# Patient Record
Sex: MALE | Race: OTHER | HISPANIC OR LATINO | URBAN - METROPOLITAN AREA
[De-identification: names, ages, dates, MRNs, and addresses within clinical notes are randomized per-mention and may not be internally consistent; named-entity substitution may affect disease eponyms.]

---

## 2017-09-06 ENCOUNTER — EMERGENCY (EMERGENCY)
Facility: HOSPITAL | Age: 37
LOS: 1 days | Discharge: HOME | End: 2017-09-06
Attending: STUDENT IN AN ORGANIZED HEALTH CARE EDUCATION/TRAINING PROGRAM
Payer: SELF-PAY

## 2017-09-06 VITALS
WEIGHT: 175.05 LBS | HEIGHT: 65 IN | SYSTOLIC BLOOD PRESSURE: 127 MMHG | TEMPERATURE: 98 F | DIASTOLIC BLOOD PRESSURE: 81 MMHG | OXYGEN SATURATION: 99 % | RESPIRATION RATE: 18 BRPM | HEART RATE: 70 BPM

## 2017-09-06 PROCEDURE — 99284 EMERGENCY DEPT VISIT MOD MDM: CPT | Mod: 25

## 2017-09-06 PROCEDURE — 99053 MED SERV 10PM-8AM 24 HR FAC: CPT

## 2017-09-06 NOTE — ED ADULT TRIAGE NOTE - CHIEF COMPLAINT QUOTE
pt c/o head and neck pain for 3 days. denies injury, states he took some medication from another hospital 1 week ago but still there.

## 2017-09-07 PROCEDURE — 72040 X-RAY EXAM NECK SPINE 2-3 VW: CPT | Mod: 26

## 2017-09-07 PROCEDURE — 99283 EMERGENCY DEPT VISIT LOW MDM: CPT | Mod: 25

## 2017-09-07 PROCEDURE — 96372 THER/PROPH/DIAG INJ SC/IM: CPT

## 2017-09-07 PROCEDURE — 72040 X-RAY EXAM NECK SPINE 2-3 VW: CPT

## 2017-09-07 RX ORDER — TIZANIDINE 4 MG/1
2 TABLET ORAL
Qty: 0 | Refills: 0 | COMMUNITY

## 2017-09-07 RX ORDER — KETOROLAC TROMETHAMINE 30 MG/ML
60 SYRINGE (ML) INJECTION ONCE
Qty: 0 | Refills: 0 | Status: DISCONTINUED | OUTPATIENT
Start: 2017-09-07 | End: 2017-09-07

## 2017-09-07 RX ADMIN — Medication 60 MILLIGRAM(S): at 02:35

## 2017-09-07 NOTE — ED PROVIDER NOTE - PROGRESS NOTE DETAILS
Pt treated with pain medication and x-ray negative for an acute fracture . Pt will F/U with Medical clinic as discussed ,

## 2017-09-07 NOTE — ED PROVIDER NOTE - OBJECTIVE STATEMENT
36 yr old M presented to ED with neck pain x 1 week. Pt explained that he got up with neck pain x 1 week ago and was sen at [facility] for his symptoms. Pt says that he was treated with pain medication and a muscle relaxant but it not relieve his symptoms. Pt denies fever, chills body aches or weakness. Pt says that his pain from the back of his neck radiated to his right arm. Pt denies any other complaints this  time.

## 2017-09-07 NOTE — ED ADULT NURSE NOTE - OBJECTIVE STATEMENT
Pt. complaining of neck pain 10/10.  Pt. states pain has been going on for 3 days without relief; pt. states he went to [facility] and was prescribed buspirone 10mg and tizanidine 4mg without relief.  Pt. states when he sleeps he is relaxed and his neck does not bother him; pt. states the pain begins once he wakes up.  No edema or erythema noted to area.  Full ROM noted.  Pt. denies trauma/injury/heavy lifting.

## 2017-09-07 NOTE — ED PROVIDER NOTE - CARE PLAN
Principal Discharge DX:	Musculoskeletal neck pain  Instructions for follow-up, activity and diet:	Continue with pain medication as discussed

## 2017-09-07 NOTE — ED PROVIDER NOTE - ATTENDING CONTRIBUTION TO CARE
37 yo male presents with musculoskeletal neck pain. I personally saw the patient with the PA, and completed the key components of the history and physical exam. I then discussed the management plan with the PA.

## 2017-09-07 NOTE — ED PROVIDER NOTE - MEDICAL DECISION MAKING DETAILS
Pt with neck pain that got worst x 2-3 day ago. Pt was seen at [facility] and he says that his symptom improved . F/U with PCP

## 2018-08-14 ENCOUNTER — EMERGENCY (EMERGENCY)
Facility: HOSPITAL | Age: 38
LOS: 1 days | Discharge: HOME | End: 2018-08-14
Attending: EMERGENCY MEDICINE
Payer: SELF-PAY

## 2018-08-14 VITALS — WEIGHT: 175.05 LBS | HEIGHT: 65 IN

## 2018-08-14 VITALS
RESPIRATION RATE: 20 BRPM | SYSTOLIC BLOOD PRESSURE: 108 MMHG | TEMPERATURE: 98 F | OXYGEN SATURATION: 99 % | HEART RATE: 59 BPM | DIASTOLIC BLOOD PRESSURE: 71 MMHG

## 2018-08-14 DIAGNOSIS — Z90.49 ACQUIRED ABSENCE OF OTHER SPECIFIED PARTS OF DIGESTIVE TRACT: Chronic | ICD-10-CM

## 2018-08-14 LAB
ALBUMIN SERPL ELPH-MCNC: 4.1 G/DL — SIGNIFICANT CHANGE UP (ref 3.3–5.2)
ALP SERPL-CCNC: 73 U/L — SIGNIFICANT CHANGE UP (ref 40–120)
ALT FLD-CCNC: 21 U/L — SIGNIFICANT CHANGE UP
ANION GAP SERPL CALC-SCNC: 12 MMOL/L — SIGNIFICANT CHANGE UP (ref 5–17)
APTT BLD: 31.7 SEC — SIGNIFICANT CHANGE UP (ref 27.5–37.4)
AST SERPL-CCNC: 18 U/L — SIGNIFICANT CHANGE UP
BASOPHILS # BLD AUTO: 0 K/UL — SIGNIFICANT CHANGE UP (ref 0–0.2)
BASOPHILS NFR BLD AUTO: 0.4 % — SIGNIFICANT CHANGE UP (ref 0–2)
BILIRUB SERPL-MCNC: 0.4 MG/DL — SIGNIFICANT CHANGE UP (ref 0.4–2)
BLD GP AB SCN SERPL QL: SIGNIFICANT CHANGE UP
BUN SERPL-MCNC: 16 MG/DL — SIGNIFICANT CHANGE UP (ref 8–20)
CALCIUM SERPL-MCNC: 9.1 MG/DL — SIGNIFICANT CHANGE UP (ref 8.6–10.2)
CHLORIDE SERPL-SCNC: 104 MMOL/L — SIGNIFICANT CHANGE UP (ref 98–107)
CO2 SERPL-SCNC: 25 MMOL/L — SIGNIFICANT CHANGE UP (ref 22–29)
CREAT SERPL-MCNC: 0.61 MG/DL — SIGNIFICANT CHANGE UP (ref 0.5–1.3)
EOSINOPHIL # BLD AUTO: 0.3 K/UL — SIGNIFICANT CHANGE UP (ref 0–0.5)
EOSINOPHIL NFR BLD AUTO: 4.6 % — SIGNIFICANT CHANGE UP (ref 0–5)
GLUCOSE SERPL-MCNC: 100 MG/DL — SIGNIFICANT CHANGE UP (ref 70–115)
HCT VFR BLD CALC: 43.9 % — SIGNIFICANT CHANGE UP (ref 42–52)
HGB BLD-MCNC: 14.8 G/DL — SIGNIFICANT CHANGE UP (ref 14–18)
INR BLD: 1.11 RATIO — SIGNIFICANT CHANGE UP (ref 0.88–1.16)
LIDOCAIN IGE QN: 35 U/L — SIGNIFICANT CHANGE UP (ref 22–51)
LYMPHOCYTES # BLD AUTO: 3 K/UL — SIGNIFICANT CHANGE UP (ref 1–4.8)
LYMPHOCYTES # BLD AUTO: 41.8 % — SIGNIFICANT CHANGE UP (ref 20–55)
MCHC RBC-ENTMCNC: 29.3 PG — SIGNIFICANT CHANGE UP (ref 27–31)
MCHC RBC-ENTMCNC: 33.7 G/DL — SIGNIFICANT CHANGE UP (ref 32–36)
MCV RBC AUTO: 86.9 FL — SIGNIFICANT CHANGE UP (ref 80–94)
MONOCYTES # BLD AUTO: 0.5 K/UL — SIGNIFICANT CHANGE UP (ref 0–0.8)
MONOCYTES NFR BLD AUTO: 7.2 % — SIGNIFICANT CHANGE UP (ref 3–10)
NEUTROPHILS # BLD AUTO: 3.2 K/UL — SIGNIFICANT CHANGE UP (ref 1.8–8)
NEUTROPHILS NFR BLD AUTO: 45.3 % — SIGNIFICANT CHANGE UP (ref 37–73)
PLATELET # BLD AUTO: 236 K/UL — SIGNIFICANT CHANGE UP (ref 150–400)
POTASSIUM SERPL-MCNC: 4.2 MMOL/L — SIGNIFICANT CHANGE UP (ref 3.5–5.3)
POTASSIUM SERPL-SCNC: 4.2 MMOL/L — SIGNIFICANT CHANGE UP (ref 3.5–5.3)
PROT SERPL-MCNC: 7.2 G/DL — SIGNIFICANT CHANGE UP (ref 6.6–8.7)
PROTHROM AB SERPL-ACNC: 12.2 SEC — SIGNIFICANT CHANGE UP (ref 9.8–12.7)
RBC # BLD: 5.05 M/UL — SIGNIFICANT CHANGE UP (ref 4.6–6.2)
RBC # FLD: 13 % — SIGNIFICANT CHANGE UP (ref 11–15.6)
SODIUM SERPL-SCNC: 141 MMOL/L — SIGNIFICANT CHANGE UP (ref 135–145)
TYPE + AB SCN PNL BLD: SIGNIFICANT CHANGE UP
WBC # BLD: 7.1 K/UL — SIGNIFICANT CHANGE UP (ref 4.8–10.8)
WBC # FLD AUTO: 7.1 K/UL — SIGNIFICANT CHANGE UP (ref 4.8–10.8)

## 2018-08-14 PROCEDURE — 99284 EMERGENCY DEPT VISIT MOD MDM: CPT | Mod: 25

## 2018-08-14 PROCEDURE — 86900 BLOOD TYPING SEROLOGIC ABO: CPT

## 2018-08-14 PROCEDURE — 99053 MED SERV 10PM-8AM 24 HR FAC: CPT

## 2018-08-14 PROCEDURE — 36415 COLL VENOUS BLD VENIPUNCTURE: CPT

## 2018-08-14 PROCEDURE — 85610 PROTHROMBIN TIME: CPT

## 2018-08-14 PROCEDURE — 83690 ASSAY OF LIPASE: CPT

## 2018-08-14 PROCEDURE — 76705 ECHO EXAM OF ABDOMEN: CPT

## 2018-08-14 PROCEDURE — 76705 ECHO EXAM OF ABDOMEN: CPT | Mod: 26

## 2018-08-14 PROCEDURE — 85027 COMPLETE CBC AUTOMATED: CPT

## 2018-08-14 PROCEDURE — 96374 THER/PROPH/DIAG INJ IV PUSH: CPT

## 2018-08-14 PROCEDURE — T1013: CPT

## 2018-08-14 PROCEDURE — 86901 BLOOD TYPING SEROLOGIC RH(D): CPT

## 2018-08-14 PROCEDURE — 80053 COMPREHEN METABOLIC PANEL: CPT

## 2018-08-14 PROCEDURE — 85730 THROMBOPLASTIN TIME PARTIAL: CPT

## 2018-08-14 PROCEDURE — 96375 TX/PRO/DX INJ NEW DRUG ADDON: CPT

## 2018-08-14 PROCEDURE — 86850 RBC ANTIBODY SCREEN: CPT

## 2018-08-14 RX ORDER — KETOROLAC TROMETHAMINE 30 MG/ML
15 SYRINGE (ML) INJECTION ONCE
Qty: 0 | Refills: 0 | Status: DISCONTINUED | OUTPATIENT
Start: 2018-08-14 | End: 2018-08-14

## 2018-08-14 RX ORDER — FAMOTIDINE 10 MG/ML
1 INJECTION INTRAVENOUS
Qty: 14 | Refills: 0 | OUTPATIENT
Start: 2018-08-14 | End: 2018-08-27

## 2018-08-14 RX ORDER — ONDANSETRON 8 MG/1
4 TABLET, FILM COATED ORAL ONCE
Qty: 0 | Refills: 0 | Status: COMPLETED | OUTPATIENT
Start: 2018-08-14 | End: 2018-08-14

## 2018-08-14 RX ORDER — ONDANSETRON 8 MG/1
1 TABLET, FILM COATED ORAL
Qty: 6 | Refills: 0 | OUTPATIENT
Start: 2018-08-14 | End: 2018-08-15

## 2018-08-14 RX ORDER — SODIUM CHLORIDE 9 MG/ML
1000 INJECTION INTRAMUSCULAR; INTRAVENOUS; SUBCUTANEOUS ONCE
Qty: 0 | Refills: 0 | Status: COMPLETED | OUTPATIENT
Start: 2018-08-14 | End: 2018-08-14

## 2018-08-14 RX ADMIN — ONDANSETRON 4 MILLIGRAM(S): 8 TABLET, FILM COATED ORAL at 06:17

## 2018-08-14 RX ADMIN — SODIUM CHLORIDE 1000 MILLILITER(S): 9 INJECTION INTRAMUSCULAR; INTRAVENOUS; SUBCUTANEOUS at 06:17

## 2018-08-14 RX ADMIN — Medication 15 MILLIGRAM(S): at 06:16

## 2018-08-14 NOTE — ED PROVIDER NOTE - PROGRESS NOTE DETAILS
Ultrasound reviewed, lab work reviewed, pt reports improvement in symptoms, surgery reviewed, pt explained results and d/c instructions, copy of results printed Upon patient being discharge, patient stating he feels dizzy, will PO challenge observe see if patient improves ev pt says he feels much better no abd pain and not dizzy will d/c and rec close f/u pmd

## 2018-08-14 NOTE — ED ADULT NURSE NOTE - NSIMPLEMENTINTERV_GEN_ALL_ED
Implemented All Universal Safety Interventions:  [place] to call system. Call bell, personal items and telephone within reach. Instruct patient to call for assistance. Room bathroom lighting operational. Non-slip footwear when patient is off stretcher. Physically safe environment: no spills, clutter or unnecessary equipment. Stretcher in lowest position, wheels locked, appropriate side rails in place.

## 2018-08-14 NOTE — ED PROVIDER NOTE - ATTENDING CONTRIBUTION TO CARE
36yo male with acute onset of ruq pain. I personally saw the patient with the PA, and completed the key components of the history and physical exam. I then discussed the management plan with the PA.

## 2018-08-14 NOTE — ED ADULT NURSE NOTE - OBJECTIVE STATEMENT
Pt c/o RLQ abdominal pain starting a few hours prior to coming to ED, denies n/v/d to RN, denies PMH, denies sick contacts/fevers at home, pt AOx4

## 2018-08-14 NOTE — ED PROVIDER NOTE - OBJECTIVE STATEMENT
Patient is a 36 y/o male c/o of abdominal pain that started three hours ago. Patient reporting abdominal pain is RUQ. Patient admitting to nausea and multiple episodes of vomiting (non-bloody, non-bilious). Patient denies experiencing pain before in the past. Patient admits to appendectomy in the past. Patient denies cp, SOB, fever, diarrhea, back pain, dysuria.

## 2018-10-11 ENCOUNTER — EMERGENCY (EMERGENCY)
Facility: HOSPITAL | Age: 38
LOS: 1 days | Discharge: LEFT BEFORE DISCHARGE | End: 2018-10-11
Attending: EMERGENCY MEDICINE
Payer: SELF-PAY

## 2018-10-11 VITALS
OXYGEN SATURATION: 96 % | HEART RATE: 65 BPM | DIASTOLIC BLOOD PRESSURE: 67 MMHG | SYSTOLIC BLOOD PRESSURE: 112 MMHG | RESPIRATION RATE: 16 BRPM | TEMPERATURE: 98 F

## 2018-10-11 VITALS — WEIGHT: 169.09 LBS

## 2018-10-11 DIAGNOSIS — Z90.49 ACQUIRED ABSENCE OF OTHER SPECIFIED PARTS OF DIGESTIVE TRACT: Chronic | ICD-10-CM

## 2018-10-11 PROCEDURE — T1013: CPT

## 2020-06-04 ENCOUNTER — EMERGENCY (EMERGENCY)
Facility: HOSPITAL | Age: 40
LOS: 1 days | Discharge: HOME | End: 2020-06-04
Attending: EMERGENCY MEDICINE
Payer: MEDICAID

## 2020-06-04 VITALS
WEIGHT: 175.93 LBS | HEIGHT: 66 IN | DIASTOLIC BLOOD PRESSURE: 72 MMHG | HEART RATE: 105 BPM | OXYGEN SATURATION: 98 % | TEMPERATURE: 98 F | SYSTOLIC BLOOD PRESSURE: 111 MMHG | RESPIRATION RATE: 20 BRPM

## 2020-06-04 DIAGNOSIS — Z90.49 ACQUIRED ABSENCE OF OTHER SPECIFIED PARTS OF DIGESTIVE TRACT: Chronic | ICD-10-CM

## 2020-06-04 PROCEDURE — 99283 EMERGENCY DEPT VISIT LOW MDM: CPT | Mod: 25

## 2020-06-04 PROCEDURE — 96372 THER/PROPH/DIAG INJ SC/IM: CPT

## 2020-06-04 PROCEDURE — T1013: CPT

## 2020-06-04 PROCEDURE — 99284 EMERGENCY DEPT VISIT MOD MDM: CPT

## 2020-06-04 RX ORDER — LIDOCAINE 4 G/100G
1 CREAM TOPICAL ONCE
Refills: 0 | Status: COMPLETED | OUTPATIENT
Start: 2020-06-04 | End: 2020-06-04

## 2020-06-04 RX ORDER — ACETAMINOPHEN 500 MG
650 TABLET ORAL ONCE
Refills: 0 | Status: COMPLETED | OUTPATIENT
Start: 2020-06-04 | End: 2020-06-04

## 2020-06-04 RX ORDER — CYCLOBENZAPRINE HYDROCHLORIDE 10 MG/1
10 TABLET, FILM COATED ORAL ONCE
Refills: 0 | Status: COMPLETED | OUTPATIENT
Start: 2020-06-04 | End: 2020-06-04

## 2020-06-04 RX ORDER — CYCLOBENZAPRINE HYDROCHLORIDE 10 MG/1
1 TABLET, FILM COATED ORAL
Qty: 9 | Refills: 0
Start: 2020-06-04 | End: 2020-06-06

## 2020-06-04 RX ORDER — KETOROLAC TROMETHAMINE 30 MG/ML
30 SYRINGE (ML) INJECTION ONCE
Refills: 0 | Status: DISCONTINUED | OUTPATIENT
Start: 2020-06-04 | End: 2020-06-04

## 2020-06-04 RX ADMIN — Medication 650 MILLIGRAM(S): at 16:41

## 2020-06-04 RX ADMIN — CYCLOBENZAPRINE HYDROCHLORIDE 10 MILLIGRAM(S): 10 TABLET, FILM COATED ORAL at 16:41

## 2020-06-04 RX ADMIN — Medication 30 MILLIGRAM(S): at 16:42

## 2020-06-04 RX ADMIN — LIDOCAINE 1 PATCH: 4 CREAM TOPICAL at 16:42

## 2020-06-04 NOTE — ED STATDOCS - CLINICAL SUMMARY MEDICAL DECISION MAKING FREE TEXT BOX
40yo male with low back pain radiating to L leg in setting of lifting daughter- suspect radicular pain, tx with meds, dc home with spine f/u. Lianet Collado DO

## 2020-06-04 NOTE — ED STATDOCS - OBJECTIVE STATEMENT
40 y/o M pt with significant PMHx of appendicitis presents to the ED c/o lower back pain that began yesterday. He describes the pain as burning. It started after he was walking with his daughter for over an hour and picking her up. He has taken 2 Tylenols yesterday which helped a litle bit. No f, n, chills, dysuria, hematuria. He reports that the pain radiates to his left leg and left foot. He reports numbness on the left leg and foot as well. He reports that the heaviest thing he lifted was his daughter, who is roughly 50 pounds. Pt denies trauma. He tested positive for COVID-19 two months ago but those sx have resolved. Pt denies hx of IV drug use. No further complaints at this time. 40 y/o M pt with significant PMHx of appendicitis presents to the ED c/o lower back pain that began yesterday. He describes the pain as burning. It started after he was walking with his daughter for over an hour and picking her up. He has taken 2 Tylenols yesterday which helped a litle bit. No f, n, chills, dysuria, hematuria. He reports that the pain radiates to his left leg and left foot. He reports numbness on the left leg and foot as well. He reports that the heaviest thing he lifted was his daughter, who is roughly 50 pounds. Pt denies trauma. He tested positive for COVID-19 two months ago but those sx have resolved. Pt denies hx of IV drug use. No further complaints at this time. Denies urinary retention, bowel/bladder incontinence, back injections, IVDA, saddle anesthesia.

## 2020-06-04 NOTE — ED STATDOCS - PHYSICAL EXAMINATION
Gen: NAD, AOx3  Head: NCAT  Lung: CTAB, no respiratory distress, no wheezing, rales, rhonchi  CV: normal s1/s2, rrr, no murmurs, Normal perfusion  Abd: soft, NTND  MSK: No edema, no visible deformities, full range of motion in all 4 extremities, +TTP paraspinal muscles thoracolumbar junction  Neuro: No midline T/L spine tenderness, strength 5/5 b/ LE, sensation intact, No focal neurologic deficits  Skin: No rash   Psych: normal affect

## 2020-06-04 NOTE — ED STATDOCS - NSFOLLOWUPCLINICS_GEN_ALL_ED_FT
[facility] - [facility]  Neurosurgery/Spine  [address] [place] [address]  Phone: [phone]  Fax:   Follow Up Time: Urgent

## 2020-06-04 NOTE — ED STATDOCS - PATIENT PORTAL LINK FT
You can access the FollowMyHealth Patient Portal offered by [facility] by registering at the following website: [email or web address]. By joining [employer]’s FollowMyHealth portal, you will also be able to view your health information using other applications (apps) compatible with our system.

## 2020-06-04 NOTE — ED ADULT TRIAGE NOTE - CHIEF COMPLAINT QUOTE
Patient arrived to ED today with c/o lower back pain from yesterday and burning to his lower back that radiates down his left leg to his foot.  Patient denies injury.  Patient was positive for COVID-19 2 months ago he states.

## 2020-06-04 NOTE — ED STATDOCS - ATTENDING CONTRIBUTION TO CARE
I, Lianet Collado, performed a face to face bedside interview with this patient regarding history of present illness, review of symptoms and relevant past medical, social and family history.  I completed an independent physical examination. Medical decision making, follow-up on ordered tests (ie labs, radiologic studies) and re-evaluation of the patient's status has been communicated to the ACP.  Disposition of the patient will be based on test outcome and response to ED interventions.

## 2020-06-04 NOTE — ED STATDOCS - NSFOLLOWUPINSTRUCTIONS_ED_ALL_ED_FT
1. Follow up with a spine specialist within 2-3days for reevaluation.  2.  Return to the Emergency Department for worsening, progressive or any other concerning symptoms.   3.  Please take Motrin 600mg by mouth every 6 hours as needed for pain. Please take this medication with food.   4. Please take tylenol 650 mg every 4 hours as needed for pain. Please do not exceed more than 4,000mg of Tylenol in a day   5.  Please take Flexeril as prescribed.     1. Anne un seguimiento con mcdonnell médico de atención primaria dentro de los 2-3 días para la reevaluación.  2. Regrese al departamento de emergencias por empeoramiento, progresivo o cualquier otro síntoma relacionado.  3. [place] Motrin 600mg por vía oral cada 6 horas según sea necesario para el dolor. [place] feli medicamento con comida.  4. [place] tylenol 650 mg cada 4 horas según sea necesario para el dolor. No exceda más de 4,000 mg de Tylenol en un día  5. [place] Flexeril según lo prescrito.    Dolor radicular  Radicular Pain  El dolor radicular es un tipo de dolor que se extiende desde la espalda o el jose angel a lo shavonne del nervio raquídeo. Los nervios raquídeos se originan en la médula pires y llegan a los músculos. El dolor radicular a veces se conoce ivanna radiculopatía, radiculitis o pinzamiento de un nervio. Si tiene feli tipo de dolor, también puede sentir debilidad, adormecimiento u hormigueo en la brigida del cuerpo a la que llega ronald nervio. El dolor puede ser shruthi y sentirse ivanna un ardor. Según el nervio raquídeo que esté afectado, el dolor puede aparecer en los siguientes lugares:  Brigida del jose angel (dolor radicular cervical). También puede sentir dolor, adormecimiento, debilidad u hormigueo en los brazos.Brigida de la mitad de la columna (dolor radicular torácico). Se siente en la espalda y en el pecho. Feli tipo es poco frecuente.Brigida inferior de la espalda (dolor radicular lumbar). Se siente un dolor lumbar. También puede sentir dolor, adormecimiento, debilidad u hormigueo en las nalgas o en las piernas. La ciática es un tipo de dolor radicular lumbar que se extiende por la parte de atrás de la pierna.El dolor radicular se produce cuando maikel de los nervios espinales se irrita o se aprieta (comprime). A menudo se debe a algo que ejerce presión sobre un nervio pires, ivanna maikel de los huesos de la columna vertebral (vértebras) o anh de las almohadillas redondas que están entre las vértebras (discos intervertebrales). [place] puede deberse a lo siguiente:  Anh lesión. El desgaste o el envejecimiento de un disco. El crecimiento de un espolón óseo que aprieta el nervio.El dolor radicular suele desaparecer cuando se siguen las indicaciones del médico sobre cómo aliviarlo en la casa.  Siga estas indicaciones en mcdonnell casa:  Control del dolor         Si se lo indican, aplique hielo sobre la brigida afectada:  Ponga el hielo en anh bolsa plástica.Coloque anh toalla entre la piel y la bolsa.Coloque el hielo roni 20 minutos, 2 a 3 veces por día.Si se lo indican, aplique calor en la brigida afectada con la frecuencia que le haya indicado el médico. Use la stefan de calor que el médico le recomiende, ivanna anh compresa de calor húmedo o anh almohadilla térmica.  Coloque anh toalla entre la piel y la stefan de calor.Aplique calor roni 20 a 30 minutos.Retire la stefan de calor si la piel se pone de color blanc brillante. [place] es especialmente importante si no puede sentir dolor, calor o frío. Puede correr un riesgo mayor de sufrir quemaduras.Actividad        No permanezca sentado o acostado roni largos períodos.Intente mantenerse lo más activo posible. Pregúntele al médico qué tipo de ejercicio o actividad es mejor para usted.Evite las actividades que empeoren el dolor, ivanna doblarse y levantar objetos.No levante ningún objeto que pese más de 10 libras (4,5 kg) o el límite de peso que le hayan indicado, hasta que el médico le diga que puede hacerlo.Use anh técnica adecuada al levantar objetos. La técnica adecuada para levantar objetos consiste en doblar las rodillas y levantarse.Anne ejercicios de fuerza y flexibilidad solamente ivanna se lo haya indicado el médico o el fisioterapeuta.Indicaciones generales     [place] los medicamentos de venta rambo y los recetados solamente ivanna se lo haya indicado el médico.Esté atento a cualquier cambio en los síntomas.Concurra a todas las visitas de seguimiento ivanna se lo haya indicado el médico. [place] es importante.  El médico lo puede enviar a un fisioterapeuta para aliviar feli dolor.Comuníquese con un médico si:  El dolor y otros síntomas empeoran.El medicamento no le calma el dolor.El dolor no mejora después de unas semanas de cuidado en el hogar.Tiene fiebre.Solicite ayuda inmediatamente si:  Tiene dolor, adormecimiento o debilidad intensos.Tiene dificultad con el control de la vejiga o los intestinos.Resumen  El dolor radicular es un tipo de dolor que se extiende desde la espalda o el jose angel a lo shavonne del nervio raquídeo.Si tiene dolor radicular, también puede sentir debilidad, adormecimiento u hormigueo en la brigida del cuerpo a la que llega ronald nervio.El dolor puede ser shruthi o sentirse ivanna ardor.El dolor radicular puede tratarse con hielo, calor, medicamentos o fisioterapia.Esta información no tiene ivanna fin reemplazar el consejo del médico. Asegúrese de hacerle al médico cualquier pregunta que tenga.

## 2020-06-24 ENCOUNTER — EMERGENCY (EMERGENCY)
Facility: HOSPITAL | Age: 40
LOS: 1 days | Discharge: HOME | End: 2020-06-24
Attending: EMERGENCY MEDICINE
Payer: MEDICAID

## 2020-06-24 VITALS
DIASTOLIC BLOOD PRESSURE: 74 MMHG | OXYGEN SATURATION: 97 % | WEIGHT: 164.02 LBS | HEIGHT: 64 IN | RESPIRATION RATE: 18 BRPM | HEART RATE: 96 BPM | TEMPERATURE: 99 F | SYSTOLIC BLOOD PRESSURE: 128 MMHG

## 2020-06-24 VITALS
RESPIRATION RATE: 16 BRPM | TEMPERATURE: 98 F | HEART RATE: 75 BPM | OXYGEN SATURATION: 100 % | SYSTOLIC BLOOD PRESSURE: 101 MMHG | DIASTOLIC BLOOD PRESSURE: 58 MMHG

## 2020-06-24 DIAGNOSIS — Z90.49 ACQUIRED ABSENCE OF OTHER SPECIFIED PARTS OF DIGESTIVE TRACT: Chronic | ICD-10-CM

## 2020-06-24 LAB
ALBUMIN SERPL ELPH-MCNC: 4.5 G/DL — SIGNIFICANT CHANGE UP (ref 3.3–5.2)
ALP SERPL-CCNC: 76 U/L — SIGNIFICANT CHANGE UP (ref 40–120)
ALT FLD-CCNC: 25 U/L — SIGNIFICANT CHANGE UP
ANION GAP SERPL CALC-SCNC: 14 MMOL/L — SIGNIFICANT CHANGE UP (ref 5–17)
APPEARANCE UR: CLEAR — SIGNIFICANT CHANGE UP
AST SERPL-CCNC: 22 U/L — SIGNIFICANT CHANGE UP
BASOPHILS # BLD AUTO: 0.04 K/UL — SIGNIFICANT CHANGE UP (ref 0–0.2)
BASOPHILS NFR BLD AUTO: 0.5 % — SIGNIFICANT CHANGE UP (ref 0–2)
BILIRUB SERPL-MCNC: 0.3 MG/DL — LOW (ref 0.4–2)
BILIRUB UR-MCNC: NEGATIVE — SIGNIFICANT CHANGE UP
BUN SERPL-MCNC: 8 MG/DL — SIGNIFICANT CHANGE UP (ref 8–20)
CALCIUM SERPL-MCNC: 9.5 MG/DL — SIGNIFICANT CHANGE UP (ref 8.6–10.2)
CHLORIDE SERPL-SCNC: 100 MMOL/L — SIGNIFICANT CHANGE UP (ref 98–107)
CO2 SERPL-SCNC: 24 MMOL/L — SIGNIFICANT CHANGE UP (ref 22–29)
COLOR SPEC: YELLOW — SIGNIFICANT CHANGE UP
CREAT SERPL-MCNC: 0.72 MG/DL — SIGNIFICANT CHANGE UP (ref 0.5–1.3)
DIFF PNL FLD: NEGATIVE — SIGNIFICANT CHANGE UP
EOSINOPHIL # BLD AUTO: 0.17 K/UL — SIGNIFICANT CHANGE UP (ref 0–0.5)
EOSINOPHIL NFR BLD AUTO: 2.2 % — SIGNIFICANT CHANGE UP (ref 0–6)
GLUCOSE SERPL-MCNC: 118 MG/DL — HIGH (ref 70–99)
GLUCOSE UR QL: NEGATIVE MG/DL — SIGNIFICANT CHANGE UP
HCT VFR BLD CALC: 43.7 % — SIGNIFICANT CHANGE UP (ref 39–50)
HGB BLD-MCNC: 14.9 G/DL — SIGNIFICANT CHANGE UP (ref 13–17)
IMM GRANULOCYTES NFR BLD AUTO: 0.5 % — SIGNIFICANT CHANGE UP (ref 0–1.5)
KETONES UR-MCNC: NEGATIVE — SIGNIFICANT CHANGE UP
LEUKOCYTE ESTERASE UR-ACNC: NEGATIVE — SIGNIFICANT CHANGE UP
LYMPHOCYTES # BLD AUTO: 2.28 K/UL — SIGNIFICANT CHANGE UP (ref 1–3.3)
LYMPHOCYTES # BLD AUTO: 28.9 % — SIGNIFICANT CHANGE UP (ref 13–44)
MCHC RBC-ENTMCNC: 29.5 PG — SIGNIFICANT CHANGE UP (ref 27–34)
MCHC RBC-ENTMCNC: 34.1 GM/DL — SIGNIFICANT CHANGE UP (ref 32–36)
MCV RBC AUTO: 86.5 FL — SIGNIFICANT CHANGE UP (ref 80–100)
MONOCYTES # BLD AUTO: 0.59 K/UL — SIGNIFICANT CHANGE UP (ref 0–0.9)
MONOCYTES NFR BLD AUTO: 7.5 % — SIGNIFICANT CHANGE UP (ref 2–14)
NEUTROPHILS # BLD AUTO: 4.76 K/UL — SIGNIFICANT CHANGE UP (ref 1.8–7.4)
NEUTROPHILS NFR BLD AUTO: 60.4 % — SIGNIFICANT CHANGE UP (ref 43–77)
NITRITE UR-MCNC: NEGATIVE — SIGNIFICANT CHANGE UP
NT-PROBNP SERPL-SCNC: 7 PG/ML — SIGNIFICANT CHANGE UP (ref 0–300)
PH UR: 8 — SIGNIFICANT CHANGE UP (ref 5–8)
PLATELET # BLD AUTO: 311 K/UL — SIGNIFICANT CHANGE UP (ref 150–400)
POTASSIUM SERPL-MCNC: 3.9 MMOL/L — SIGNIFICANT CHANGE UP (ref 3.5–5.3)
POTASSIUM SERPL-SCNC: 3.9 MMOL/L — SIGNIFICANT CHANGE UP (ref 3.5–5.3)
PROT SERPL-MCNC: 7.3 G/DL — SIGNIFICANT CHANGE UP (ref 6.6–8.7)
PROT UR-MCNC: NEGATIVE MG/DL — SIGNIFICANT CHANGE UP
RBC # BLD: 5.05 M/UL — SIGNIFICANT CHANGE UP (ref 4.2–5.8)
RBC # FLD: 12.8 % — SIGNIFICANT CHANGE UP (ref 10.3–14.5)
SODIUM SERPL-SCNC: 138 MMOL/L — SIGNIFICANT CHANGE UP (ref 135–145)
SP GR SPEC: 1.01 — SIGNIFICANT CHANGE UP (ref 1.01–1.02)
TROPONIN T SERPL-MCNC: <0.01 NG/ML — SIGNIFICANT CHANGE UP (ref 0–0.06)
TROPONIN T SERPL-MCNC: <0.01 NG/ML — SIGNIFICANT CHANGE UP (ref 0–0.06)
UROBILINOGEN FLD QL: NEGATIVE MG/DL — SIGNIFICANT CHANGE UP
WBC # BLD: 7.88 K/UL — SIGNIFICANT CHANGE UP (ref 3.8–10.5)
WBC # FLD AUTO: 7.88 K/UL — SIGNIFICANT CHANGE UP (ref 3.8–10.5)

## 2020-06-24 PROCEDURE — 99284 EMERGENCY DEPT VISIT MOD MDM: CPT | Mod: 25

## 2020-06-24 PROCEDURE — 93005 ELECTROCARDIOGRAM TRACING: CPT

## 2020-06-24 PROCEDURE — 71046 X-RAY EXAM CHEST 2 VIEWS: CPT | Mod: 26

## 2020-06-24 PROCEDURE — 84484 ASSAY OF TROPONIN QUANT: CPT

## 2020-06-24 PROCEDURE — 85027 COMPLETE CBC AUTOMATED: CPT

## 2020-06-24 PROCEDURE — 96374 THER/PROPH/DIAG INJ IV PUSH: CPT

## 2020-06-24 PROCEDURE — 36415 COLL VENOUS BLD VENIPUNCTURE: CPT

## 2020-06-24 PROCEDURE — 71046 X-RAY EXAM CHEST 2 VIEWS: CPT

## 2020-06-24 PROCEDURE — T1013: CPT

## 2020-06-24 PROCEDURE — 99285 EMERGENCY DEPT VISIT HI MDM: CPT

## 2020-06-24 PROCEDURE — 83880 ASSAY OF NATRIURETIC PEPTIDE: CPT

## 2020-06-24 PROCEDURE — 80053 COMPREHEN METABOLIC PANEL: CPT

## 2020-06-24 PROCEDURE — 93010 ELECTROCARDIOGRAM REPORT: CPT

## 2020-06-24 PROCEDURE — 81003 URINALYSIS AUTO W/O SCOPE: CPT

## 2020-06-24 RX ORDER — KETOROLAC TROMETHAMINE 30 MG/ML
15 SYRINGE (ML) INJECTION ONCE
Refills: 0 | Status: DISCONTINUED | OUTPATIENT
Start: 2020-06-24 | End: 2020-06-24

## 2020-06-24 RX ADMIN — Medication 15 MILLIGRAM(S): at 15:06

## 2020-06-24 NOTE — ED ADULT NURSE NOTE - NS ED NOTE ABUSE RESPONSE YN
I performed a brief history of the patient here in triage and I have determined that pt will need further treatment and evaluation from the main side ER physician. I have placed initial orders based on the history to help in expediting patients care.        MARIAELENA Huff
Pain 4/10 at time of dc. Pt in nad. Wheeled to ED lobby accompanied by family. Pt agreeable to dc plan. I have reviewed discharge instructions with the patient. The patient verbalized understanding.
Pt resting in position of comfort in nad, awaits results. Family @bs.
Yes

## 2020-06-24 NOTE — ED PROVIDER NOTE - PROGRESS NOTE DETAILS
HPI and intake orders and PE reviewed, labs and UA and CXR unremarkable, pending second set troponin and EKG will re-assess second trop negative, rpt EKG shows non-ischemic changes, stable for discharge to follow up with outpatient cardiology second trop negative, rpt EKG shows non-ischemic changes, sinus guadalupe 50, stable for discharge to follow up with outpatient cardiology.  Patient given copies of all results, understands and agrees to proceed. second trop negative, rpt EKG shows non-ischemic changes, sinus guadalupe 50, stable for discharge to follow up with outpatient cardiology.  Patient reports R shoulder pain worse with movement, denies any trauma or falls.  Saw PCP and referred to ED secondary to chest pain.  Patient has no pmhx or shx, denies any recent travel, unilateral leg swelling, h/o PE, PERC negative.  Patient given copies of all results, understands and agrees to proceed.  Advised to take NSAIDS PRN, rest, ice, elevation and follow up with PCP.

## 2020-06-24 NOTE — ED ADULT NURSE NOTE - OBJECTIVE STATEMENT
pt with middle upper back pain  "my wife is worried about covid"  pt was sick 'like 4 mths ago' with covid was in [facility] for 3 days  denies any o2 use.

## 2020-06-24 NOTE — ED PROVIDER NOTE - PATIENT PORTAL LINK FT
You can access the FollowMyHealth Patient Portal offered by [facility] by registering at the following website: [email or web address]. By joining [employer]’s FollowMyHealth portal, you will also be able to view your health information using other applications (apps) compatible with our system.

## 2020-06-24 NOTE — ED ADULT NURSE NOTE - NSIMPLEMENTINTERV_GEN_ALL_ED
Implemented All Universal Safety Interventions:  [place] to call system. Call bell, personal items and telephone within reach. Instruct patient to call for assistance. Room bathroom lighting operational. Non-slip footwear when patient is off stretcher. Physically safe environment: no spills, clutter or unnecessary equipment. Stretcher in lowest position, wheels locked, appropriate side rails in place.

## 2020-06-24 NOTE — ED PROVIDER NOTE - CLINICAL SUMMARY MEDICAL DECISION MAKING FREE TEXT BOX
38y/o M no PMHx p/w left sided CP radiating to back for 2 days. Assoc. symptoms of nausea.   VSS PE unremarkable. Well appearing. Low evelia for ACS, PERC negative, Low evelia for PE. Consider post viral pleurisy. Low evelia for Pericarditis(EKG NSR/no KEZIA), low suspicion for myocarditis. 40y/o M no PMHx p/w left sided CP radiating to back for 2 days. Assoc. symptoms of nausea.   VSS PE unremarkable. Well appearing. Consider post viral pleurisy vs msk pain/costochondritis. Low evelia for ACS, PERC negative, Low evelia for PE. Low evelia for Pericarditis(EKG NSR/no KEZIA), low suspicion for myocarditis, aortic dissection.

## 2020-06-24 NOTE — ED PROVIDER NOTE - CARE PROVIDER_API CALL
Chris Peterson  CARDIOVASCULAR DISEASE  [address] [place] [id number]  Phone: [phone]  Fax: [phone]  Follow Up Time:

## 2020-06-24 NOTE — ED PROVIDER NOTE - OBJECTIVE STATEMENT
38y/o M with no significant PMHx presents to the ED c/o worsening CP which began 1 day ago radiating to back. Assoc. symptoms of subjective fever and vomiting. Pt was COVID + 4 months ago. Denies SOB, cough, chills, palpitations, diaphoresis or vomiting. 38y/o M with no significant PMHx presents to the ED c/o worsening CP which began 1 day ago radiating to back. Assoc. symptoms of subjective fever and vomiting. Pt was COVID + 4 months ago. Denies SOB, cough, chills, palpitations, diaphoresis or vomiting. Admits dysuria.

## 2020-06-24 NOTE — ED PROVIDER NOTE - PHYSICAL EXAMINATION
Constitutional: Awake, Alert, non-toxic-appearing. NAD. Well appearing, well nourished. Cooperative. VSS.  HEAD: NC/AT; no signs of trauma   EYES: Conjunctiva and sclera are clear bilaterally. EOMI,   ENT: No rhinorrhea, normal pharynx, oropharynx patent, no tonsillar exudates or enlargement, MMM, no erythema, no drooling or stridor.   NECK: Supple, non-tender. No nuchal rigidity. FROM. No midline or paraspinal TTP.  CARDIOVASCULAR: Normal S1, S2; RRR. No audible murmurs. No chest wall ttp. Radial pulses +2 B/L.  RESPIRATORY: Speaking full sentences. Normal respiratory effort; breath sounds CTAB, No WRR. No accessory muscle use.   ABDOMEN: Soft; NTND. +BS x4 quadrants.  EXTREMITIES: Full passive and active ROM in all extremities; non-tender to palpation; distal pulses palpable and symmetric, no edema, no crepitus or step off.  SKIN: Warm, dry; good skin turgor, no apparent lesions or rashes, no ecchymosis, brisk capillary refill.  NEURO: AAOx3 follows commands. No facial droop.

## 2020-06-24 NOTE — ED ADULT NURSE NOTE - NS_ED_NURSE_TEACHING_TOPIC_ED_A_ED
Other elevated white blood cell (WBC) count Other elevated white blood cell (WBC) count Other elevated white blood cell (WBC) count Other elevated white blood cell (WBC) count Other elevated white blood cell (WBC) count Other elevated white blood cell (WBC) count Other elevated white blood cell (WBC) count Other elevated white blood cell (WBC) count Other elevated white blood cell (WBC) count Other elevated white blood cell (WBC) count Other elevated white blood cell (WBC) count Other elevated white blood cell (WBC) count Other elevated white blood cell (WBC) count Other elevated white blood cell (WBC) count Cardiac/by PA

## 2020-06-28 ENCOUNTER — EMERGENCY (EMERGENCY)
Facility: HOSPITAL | Age: 40
LOS: 1 days | Discharge: HOME | End: 2020-06-28
Attending: EMERGENCY MEDICINE
Payer: MEDICAID

## 2020-06-28 VITALS
DIASTOLIC BLOOD PRESSURE: 89 MMHG | HEIGHT: 64 IN | TEMPERATURE: 98 F | SYSTOLIC BLOOD PRESSURE: 122 MMHG | RESPIRATION RATE: 18 BRPM | HEART RATE: 120 BPM | OXYGEN SATURATION: 97 % | WEIGHT: 188.94 LBS

## 2020-06-28 VITALS — HEART RATE: 95 BPM

## 2020-06-28 DIAGNOSIS — Z90.49 ACQUIRED ABSENCE OF OTHER SPECIFIED PARTS OF DIGESTIVE TRACT: Chronic | ICD-10-CM

## 2020-06-28 LAB
APPEARANCE UR: CLEAR — SIGNIFICANT CHANGE UP
BILIRUB UR-MCNC: NEGATIVE — SIGNIFICANT CHANGE UP
COLOR SPEC: YELLOW — SIGNIFICANT CHANGE UP
DIFF PNL FLD: NEGATIVE — SIGNIFICANT CHANGE UP
GLUCOSE UR QL: NEGATIVE MG/DL — SIGNIFICANT CHANGE UP
KETONES UR-MCNC: NEGATIVE — SIGNIFICANT CHANGE UP
LEUKOCYTE ESTERASE UR-ACNC: NEGATIVE — SIGNIFICANT CHANGE UP
NITRITE UR-MCNC: NEGATIVE — SIGNIFICANT CHANGE UP
PH UR: 6.5 — SIGNIFICANT CHANGE UP (ref 5–8)
PROT UR-MCNC: NEGATIVE MG/DL — SIGNIFICANT CHANGE UP
SP GR SPEC: 1.01 — SIGNIFICANT CHANGE UP (ref 1.01–1.02)
UROBILINOGEN FLD QL: NEGATIVE MG/DL — SIGNIFICANT CHANGE UP

## 2020-06-28 PROCEDURE — 72131 CT LUMBAR SPINE W/O DYE: CPT

## 2020-06-28 PROCEDURE — 99284 EMERGENCY DEPT VISIT MOD MDM: CPT

## 2020-06-28 PROCEDURE — 99284 EMERGENCY DEPT VISIT MOD MDM: CPT | Mod: 25

## 2020-06-28 PROCEDURE — 81003 URINALYSIS AUTO W/O SCOPE: CPT

## 2020-06-28 PROCEDURE — 87086 URINE CULTURE/COLONY COUNT: CPT

## 2020-06-28 PROCEDURE — 72131 CT LUMBAR SPINE W/O DYE: CPT | Mod: 26

## 2020-06-28 PROCEDURE — T1013: CPT

## 2020-06-28 PROCEDURE — 87491 CHLMYD TRACH DNA AMP PROBE: CPT

## 2020-06-28 PROCEDURE — 87591 N.GONORRHOEAE DNA AMP PROB: CPT

## 2020-06-28 PROCEDURE — 96372 THER/PROPH/DIAG INJ SC/IM: CPT

## 2020-06-28 RX ORDER — METHOCARBAMOL 500 MG/1
1500 TABLET, FILM COATED ORAL ONCE
Refills: 0 | Status: COMPLETED | OUTPATIENT
Start: 2020-06-28 | End: 2020-06-28

## 2020-06-28 RX ORDER — KETOROLAC TROMETHAMINE 30 MG/ML
30 SYRINGE (ML) INJECTION ONCE
Refills: 0 | Status: DISCONTINUED | OUTPATIENT
Start: 2020-06-28 | End: 2020-06-28

## 2020-06-28 RX ORDER — LIDOCAINE 4 G/100G
1 CREAM TOPICAL ONCE
Refills: 0 | Status: COMPLETED | OUTPATIENT
Start: 2020-06-28 | End: 2020-06-28

## 2020-06-28 RX ADMIN — Medication 30 MILLIGRAM(S): at 04:55

## 2020-06-28 RX ADMIN — LIDOCAINE 1 PATCH: 4 CREAM TOPICAL at 04:59

## 2020-06-28 RX ADMIN — METHOCARBAMOL 1500 MILLIGRAM(S): 500 TABLET, FILM COATED ORAL at 04:55

## 2020-06-28 NOTE — ED PROVIDER NOTE - MUSCULOSKELETAL, MLM
Spine appears normal, no stepoffs/deformities. tender to left sided paraspinal of the left upper thoracic and left lumbar. range of motion is not limited, no muscle or joint tenderness, able to ambulate without difficulties.

## 2020-06-28 NOTE — ED PROVIDER NOTE - ATTENDING CONTRIBUTION TO CARE
Dr. Osborn : I have personally seen and examined this patient at the bedside. I have fully participated in the care of this patient. I have reviewed all pertinent clinical information, including history, physical exam, plan and the PA's note and agree except as noted.     40 y/o male with no sign medical history presents to the ED c/o left sided back pain radiating from the upper back down to the lower back and to the left leg sp fall 4 weeks ago .notes pain worse with movement. Pt complaining of urinary hesitancy that started yesterday. Pt tried to take mobic last dose approx 7 hours ago without relief of the pain. No fevers, chills, hx of cancer, urinary/bowel incontinence, IVDU, steroid use, unexplained weight loss, numbness/tingling.      Denies f/c/n/v/cp/sob/palpitations/cough/abd.pain/d/c/dysuria/hematuria. sick contacts/recent travel.    PE:  head; atraumatic normocephalic  eyes: perrla  Heart: rrr s1s2  lungs: ctab  abd: soft, nt nd + bs no rebound/guarding no cva ttp  le: no swelling no calf ttp  back: no midline cervical/thoracic/lumbar ttp; muscle ttp to left side of the back paravertebral/buttocks area      -->most likely msk pain; but now notes urinary hesitancy will fu ct lumbar ua pain control anticipate dc with spine center follow up

## 2020-06-28 NOTE — ED PROVIDER NOTE - CLINICAL SUMMARY MEDICAL DECISION MAKING FREE TEXT BOX
40 y/o male with no sign medical history presents to the ED c/o left sided back pain radiating from the upper back down to the lower back and to the left leg. will obtain urine, ct of lumbar spine and reasses

## 2020-06-28 NOTE — ED PROVIDER NOTE - CARE PROVIDER_API CALL
Shaheed Marin  ORTHOPAEDIC SURGERY  [address] [place] [address]  Phone: [phone]  Fax: [phone]  Follow Up Time:

## 2020-06-28 NOTE — ED PROVIDER NOTE - PROGRESS NOTE DETAILS
PA NOTE: Neg CT, urine wnl, Pt reassessed, pt feeling better at this time, vss, pt able to walk, talk and vocalized plan of action. Discussed in depth and explained to pt in depth the next steps that need to be taking including proper follow up with PCP or specialists. All incidental findings were discussed with pt as well. Pt verbalized their concerns and all questions were answered. Pt understands dispo and wants discharge. Given good instructions when to return to ED and importance of f/u.

## 2020-06-28 NOTE — ED PROVIDER NOTE - PATIENT PORTAL LINK FT
You can access the FollowMyHealth Patient Portal offered by [facility] by registering at the following website: [email or web address]. By joining [employer]’s FollowMyHealth portal, you will also be able to view your health information using other applications (apps) compatible with our system.

## 2020-06-28 NOTE — ED PROVIDER NOTE - OBJECTIVE STATEMENT
38 y/o male with no sign medical history presents to the ED c/o left sided back pain radiating from the upper back down to the lower back and to the left leg. Pt notes 4 weeks ago he fell and hit his back but 1 week ago the pain began to worsen. Pain occurs with walking and movement and alleviated by rest. Pt complaining of urinary hesitancy. Pt tried to take mobic last dose approx 7 hours ago without relief of the pain. No fevers, chills, hx of cancer, urinary/bowel incontinence, IVDU, steroid use, unexplained weight loss, numbness/tingling.

## 2020-06-28 NOTE — ED ADULT TRIAGE NOTE - CHIEF COMPLAINT QUOTE
patient was here 4 days ago with same complaint, patient states that he has lower back pain now radiating down bilateral legs and feet, patient states that he fell 4 weeks ago and has been having pain on and off since

## 2020-06-28 NOTE — ED PROVIDER NOTE - CHPI ED SYMPTOMS NEG
no numbness/no bowel dysfunction/no constipation/no tingling/no motor function loss/no difficulty bearing weight/no bladder dysfunction/no anorexia/no fatigue/no neck tenderness

## 2020-06-29 LAB
C TRACH RRNA SPEC QL NAA+PROBE: SIGNIFICANT CHANGE UP
CULTURE RESULTS: SIGNIFICANT CHANGE UP
N GONORRHOEA RRNA SPEC QL NAA+PROBE: SIGNIFICANT CHANGE UP
SPECIMEN SOURCE: SIGNIFICANT CHANGE UP
SPECIMEN SOURCE: SIGNIFICANT CHANGE UP

## 2020-07-16 ENCOUNTER — EMERGENCY (EMERGENCY)
Facility: HOSPITAL | Age: 40
LOS: 1 days | Discharge: HOME | End: 2020-07-16
Attending: EMERGENCY MEDICINE
Payer: MEDICAID

## 2020-07-16 VITALS
RESPIRATION RATE: 18 BRPM | HEART RATE: 73 BPM | HEIGHT: 65 IN | WEIGHT: 169.09 LBS | DIASTOLIC BLOOD PRESSURE: 69 MMHG | SYSTOLIC BLOOD PRESSURE: 106 MMHG | TEMPERATURE: 98 F | OXYGEN SATURATION: 98 %

## 2020-07-16 DIAGNOSIS — Z90.49 ACQUIRED ABSENCE OF OTHER SPECIFIED PARTS OF DIGESTIVE TRACT: Chronic | ICD-10-CM

## 2020-07-16 PROCEDURE — T1013: CPT

## 2020-07-16 PROCEDURE — 99283 EMERGENCY DEPT VISIT LOW MDM: CPT

## 2020-07-16 PROCEDURE — 36415 COLL VENOUS BLD VENIPUNCTURE: CPT

## 2020-07-16 PROCEDURE — 86666 EHRLICHIA ANTIBODY: CPT

## 2020-07-16 PROCEDURE — 86753 PROTOZOA ANTIBODY NOS: CPT

## 2020-07-16 RX ORDER — IBUPROFEN 200 MG
600 TABLET ORAL ONCE
Refills: 0 | Status: COMPLETED | OUTPATIENT
Start: 2020-07-16 | End: 2020-07-16

## 2020-07-16 RX ADMIN — Medication 600 MILLIGRAM(S): at 20:40

## 2020-07-16 RX ADMIN — Medication 200 MILLIGRAM(S): at 20:40

## 2020-07-16 NOTE — ED PROVIDER NOTE - PATIENT PORTAL LINK FT
You can access the FollowMyHealth Patient Portal offered by [facility] by registering at the following website: [email or web address]. By joining [employer]’s FollowMyHealth portal, you will also be able to view your health information using other applications (apps) compatible with our system.

## 2020-07-16 NOTE — ED ADULT NURSE NOTE - OBJECTIVE STATEMENT
38y/o male c/o tick bite to suprapubic area now with muscle weakness and head discomfort.  denies fever/chills/cp/sob

## 2020-07-16 NOTE — ED PROVIDER NOTE - OBJECTIVE STATEMENT
40 y/o male with hx of covid 3 months ago presents to the ED c/o sore throat and muscle aches x 3 days. Pt notes he had a tick bite, on for approx 1 days occurred 6 days ago. Pt notes he began to feel body aches and sore throat. Went to see his PCP today, was swabbed for covid and sent to ED for further evaluation. Pt denies fever at home, shortness of breath, chest pain, abdominal pain, nausea, vomiting, ear pain.

## 2020-07-16 NOTE — ED ADULT NURSE NOTE - NS ED NOTE  TALK SOMEONE YN
Received shift report and assumed care of patient.  Patient awake, calm and stable, currently positioned in bed for comfort and safety; call light within reach.  Denies pain or discomfort at this time.  Will continue to monitor.   No

## 2020-07-16 NOTE — ED PROVIDER NOTE - ATTENDING CONTRIBUTION TO CARE
39 year old male no PMHx c/o muscle aches and sore throat x few days, + tick bite 6 days ago.  PE: NAD, HEENT WNL, abd NTND, tiny erythematous macule on suprapubic area. I&P: myalgia and pharyngitis, patient given prophylactic doxycycline for Lyme; PMD follow up

## 2020-07-16 NOTE — ED PROVIDER NOTE - CLINICAL SUMMARY MEDICAL DECISION MAKING FREE TEXT BOX
38 y/o male with hx of covid 3 months ago presents to the ED c/o sore throat and muscle aches x 3 days. + tick bite on for approx 1 day, will give 1 dose of doxy, was swab for covid today by pcp, vital signs stable. s/s of lymes given to the patient, sore throat maybe viral in nature no evidence of strep

## 2020-07-16 NOTE — ED PROVIDER NOTE - NSFOLLOWUPINSTRUCTIONS_ED_ALL_ED_FT
¿Qué es la enfermedad de Lyme?  La enfermedad de Lyme es shreya enfermedad que puede hacerte sentir ivanna si tuvieras gripe. También puede causar erupción cutánea, fiebre o problemas nerviosos, articulares o cardíacos.    Las personas pueden contraer la enfermedad de Lyme después de ser mordidas por un pequeño insecto llamado garrapata. Cuando un cierto tipo de garrapata te muerde, puede transmitir el germen que causa la enfermedad de Lyme desde mcdonnell cuerpo al tuyo. Chris shreya garrapata puede infectarte solo si permanece unida roni al menos un día y medio.    Las garrapatas que transmiten la enfermedad de Lyme se alimentan de ciervos y ratones. Son sólo del tamaño de shreya semilla de amapola cuando son jóvenes, que es cuando más a menudo propagan la enfermedad de Lyme. Crecen hasta aproximadamente el tamaño de shreya semilla de sésamo ivanna adultos (figura 1). Las garrapatas se encuentran en la hierba nat y en los arbustos, y pueden adherirse a los animales y las personas que caminan por. Las garrapatas no pueden volar ni saltar.      ¿Elidia piter a un médico o enfermero?  Sí. Si tienes síntomas de la enfermedad de Lyme, consulta con un médico o enfermero. Algunas personas no saben que fueron mordidas por shreya garrapata. O es posible que no recuerden tener shreya erupción u otros síntomas tempranos.    ¿Hay shreya prueba para la enfermedad de Lyme?  Sí. Los análisis de zachery pueden mostrar si usted está infectado con el germen que causa la enfermedad de Lyme. Chris los análisis de zachery tardan tiempo en judith positivo. Tinsman significa que las pruebas no funcionarán si las obtienes corky después de ser mordida. Además, los análisis de zachery generalmente vuelven negativos cuando tienes la erupción inicial que va con la enfermedad de Lyme. Debido a esto, si usted tiene la erupción, no necesita un análisis de zachery para confirmar que tiene la enfermedad de Lyme.    Si tu médico o enfermero sospecha que tienes la enfermedad de Lyme, él o gina hará un examen y te hará preguntas. El médico o enfermero utilizará esta información (y el resultado de mcdonnell análisis de zachery, si es necesario) para decidir sobre el tratamiento.    ¿Cómo se trata la enfermedad de Lyme?  La enfermedad de Lyme generalmente se trata con antibióticos. Hay algunos tipos diferentes. El tratamiento con antibióticos debe ayudar a que los síntomas desaparezcan. A veces, los síntomas mejoran rápidamente. Otras veces, los síntomas pueden tardar semanas o meses en desaparecer.  ¿Se puede prevenir la enfermedad de Lyme?  La mejor manera de prevenir la enfermedad de Lyme es evitar ser mordido por shreya garrapata. Chris si ya te mordieron, el médico podría darte un antibiótico. En algunas situaciones, esto puede reducir las posibilidades de contraer la enfermedad de Lyme.    Para tratar de evitar ser mordido por shreya garrapata, puedes:  •Usa zapatos, camisas de manga larga y pantalones largos cuando salgas. Mantén las garrapatas alejadas de tu piel metiéndote los pantalones en los calcetines.  •Usa colores marcell para que puedas detectar cualquier garrapata que se ponga en tu ropa.  •Use repelente de insectos que proteja contra las garrapatas, ivanna un aerosol o shreya crema que contenga DEET. Sin embargo, debe hablar con mcdonnell médico o enfermero sobre el uso de DEET en niños. Por ejemplo, DEET no debe utilizarse en bebés menores de 2 meses, y los expertos sugieren no usar productos con más del 30 por ciento de DEET en otros niños pequeños.    En la ropa y el equipo, puedes usar repelentes de insectos que tengan un producto químico llamado "permetrina".   • Ducha dentro de las 2 horas de estar al aire rambo si crees que has estado en un área donde hay garrapatas.  •Coloque la ropa seca brevemente (roni unos 4 minutos) en shreya secadora después de estar al aire rambo.  •Compruebe mcdonnell ropa y mcdonnell cuerpo en busca de garrapatas después de estar al aire armbo. Asegúrate de revisar el cuero cabelludo, la cintura, las axilas, la caesar y la parte posterior de las rodillas. Revisen a evelia hijos también.  Si khari en un lugar que tiene ciervos o ratones cerca, omar medidas para mantener a esos animales alejados. Los ciervos y los ratones llevan garrapatas.  Si encuentras shreya garrapata en tu cuerpo o en tu hijo, usa pinzas para agarrarla. A continuación, tire de él lentamente y suavemente. Después de eso, lave la gerson con agua y jabón.      ¿Se puede prevenir la enfermedad de Lyme?  La mejor manera de prevenir la enfermedad de Lyme es evitar ser mordido por shreya garrapata. Chris si ya te mordieron, el médico podría darte un antibiótico. En algunas situaciones, esto puede reducir las posibilidades de contraer la enfermedad de Lyme.    Para tratar de evitar ser mordido por shreya garrapata, puedes:    •Usa zapatos, camisas de manga larga y pantalones largos cuando salgas. Mantén las garrapatas alejadas de tu piel metiéndote los pantalones en los calcetines.    •Usa colores marcell para que puedas detectar cualquier garrapata que se ponga en tu ropa.    •Use repelente de insectos que proteja contra las garrapatas, ivanna un aerosol o shreya crema que contenga DEET. Sin embargo, debe hablar con mcdonnell médico o enfermero sobre el uso de DEET en niños. Por ejemplo, DEET no debe utilizarse en bebés menores de 2 meses, y los expertos sugieren no usar productos con más del 30 por ciento de DEET en otros niños pequeños.      En la ropa y el equipo, puedes usar repelentes de insectos que tengan un producto químico llamado "permetrina".     • Ducha dentro de las 2 horas de estar al aire rambo si crees que has estado en un área donde hay garrapatas.    •Coloque la ropa seca brevemente (roni unos 4 minutos) en shreya secadora después de estar al aire rambo.    •Compruebe mcdonnell ropa y mcdonnell cuerpo en busca de garrapatas después de estar al aire rambo. Asegúrate de revisar el cuero cabelludo, la cintura, las axilas, la caesar y la parte posterior de las rodillas. Revisen a evelia hijos también.    Si khari en un lugar que tiene ciervos o ratones cerca, omar medidas para mantener a esos animales alejados. Los ciervos y los ratones llevan garrapatas.    Si encuentras shreya garrapata en tu cuerpo o en tu hijo, usa pinzas para agarrarla. A continuación, tire de él lentamente y suavemente. Después de eso, lave la gerson con agua y jabón.    No es necesario mantener la garrapata. Chris saber cómo era puede ayudar a mcdonnell médico a decidir acerca de mcdonnell tratamiento. Kylah si puede decir:    Mcdonnell color y tamaño    Si estaba unido a tu piel o simplemente descansando sobre tu piel    Si fuera juan, ilene y lleno de zachery    Debe consultar a mcdonnell médico o enfermero si tiene shreya garrapata y no puede quitarla.    También debe llamar a mcdonnell médico o enfermero si milena que ha tenido shreya garrapata conectada roni al menos 36 horas (un día y medio). Luego, él o gina puede decidir si necesitas constantin shreya dosis de un antibiótico para ayudar a prevenir Lyme. Los médicos solo recomiendan antibióticos para prevenir la enfermedad de Lyme en algunas situaciones. Depende de tu edad, de dónde dung, de qué tipo de garrapata te mordió y cuánto tiempo estuvo unida.    Si usted o mcdonnell hijo fueron mordidos por shreya garrapata de ciervo, usted debe observar el área alrededor de la mordida roni un mes para piter si aparece shreya erupción.      Anne un seguimiento con mcdonnell médico y obtenga análisis de zachery en 6 semanas.

## 2020-07-16 NOTE — ED PROVIDER NOTE - CARE PLAN
Principal Discharge DX:	Tick bite of abdomen, initial encounter  Secondary Diagnosis:	Myalgia  Secondary Diagnosis:	Sore throat

## 2020-07-16 NOTE — ED PROVIDER NOTE - SKIN, MLM
Skin normal color for race, warm, dry and intact. No evidence of rash. + lesion suprapubic, no bulls eye lesion, no streaking. no evidence of tick

## 2020-07-21 LAB
A PHAGOCYTOPH IGG TITR SER IF: SIGNIFICANT CHANGE UP TITER
B BURGDOR AB SER QL IA: NEGATIVE — SIGNIFICANT CHANGE UP
B MICROTI IGG TITR SER: SIGNIFICANT CHANGE UP TITER
E CHAFFEENSIS IGG TITR SER IF: SIGNIFICANT CHANGE UP TITER

## 2020-12-27 NOTE — ED ADULT TRIAGE NOTE - CCCP TRG CHIEF CMPLNT
Problem: Falls - Risk of:  Goal: Will remain free from falls  Description: Will remain free from falls  12/27/2020 0205 by Aguilar Hinojosa RN  Outcome: Met This Shift  12/26/2020 2015 by Danny Morel RN  Outcome: Ongoing  Goal: Absence of physical injury  Description: Absence of physical injury  12/27/2020 0205 by Aguilar Hinojosa RN  Outcome: Met This Shift  12/26/2020 2015 by Danny Morel RN  Outcome: Ongoing   Alert and oriented x4 Dyspnic with min. Exertion Sats. WNL O2 up to 5L tonight. Lungs diminshed. Cough and deep breathing exercises encouraged. Frequently medicated for c/o right leg pain Suppository given tonight for c/o constipation with medium BM. F/c patent draining clear dark yellow urine. Cath. Care php Frequently refuses turning even with education and encouragement provided.  Free from fall/injury bite, insect

## 2021-01-13 ENCOUNTER — EMERGENCY (EMERGENCY)
Facility: HOSPITAL | Age: 41
LOS: 1 days | Discharge: HOME | End: 2021-01-13
Attending: STUDENT IN AN ORGANIZED HEALTH CARE EDUCATION/TRAINING PROGRAM
Payer: MEDICAID

## 2021-01-13 VITALS
TEMPERATURE: 98 F | DIASTOLIC BLOOD PRESSURE: 81 MMHG | OXYGEN SATURATION: 99 % | HEART RATE: 83 BPM | SYSTOLIC BLOOD PRESSURE: 143 MMHG | WEIGHT: 175.05 LBS | HEIGHT: 65 IN | RESPIRATION RATE: 19 BRPM

## 2021-01-13 DIAGNOSIS — Z90.49 ACQUIRED ABSENCE OF OTHER SPECIFIED PARTS OF DIGESTIVE TRACT: Chronic | ICD-10-CM

## 2021-01-13 LAB
ALBUMIN SERPL ELPH-MCNC: 4.3 G/DL — SIGNIFICANT CHANGE UP (ref 3.3–5.2)
ALP SERPL-CCNC: 74 U/L — SIGNIFICANT CHANGE UP (ref 40–120)
ALT FLD-CCNC: 44 U/L — HIGH
ANION GAP SERPL CALC-SCNC: 8 MMOL/L — SIGNIFICANT CHANGE UP (ref 5–17)
APTT BLD: 32.1 SEC — SIGNIFICANT CHANGE UP (ref 27.5–35.5)
AST SERPL-CCNC: 28 U/L — SIGNIFICANT CHANGE UP
BASOPHILS # BLD AUTO: 0.04 K/UL — SIGNIFICANT CHANGE UP (ref 0–0.2)
BASOPHILS NFR BLD AUTO: 0.5 % — SIGNIFICANT CHANGE UP (ref 0–2)
BILIRUB SERPL-MCNC: 0.4 MG/DL — SIGNIFICANT CHANGE UP (ref 0.4–2)
BUN SERPL-MCNC: 18 MG/DL — SIGNIFICANT CHANGE UP (ref 8–20)
CALCIUM SERPL-MCNC: 9.3 MG/DL — SIGNIFICANT CHANGE UP (ref 8.6–10.2)
CHLORIDE SERPL-SCNC: 104 MMOL/L — SIGNIFICANT CHANGE UP (ref 98–107)
CO2 SERPL-SCNC: 26 MMOL/L — SIGNIFICANT CHANGE UP (ref 22–29)
CREAT SERPL-MCNC: 0.64 MG/DL — SIGNIFICANT CHANGE UP (ref 0.5–1.3)
EOSINOPHIL # BLD AUTO: 0.66 K/UL — HIGH (ref 0–0.5)
EOSINOPHIL NFR BLD AUTO: 8.2 % — HIGH (ref 0–6)
GLUCOSE SERPL-MCNC: 94 MG/DL — SIGNIFICANT CHANGE UP (ref 70–99)
HCT VFR BLD CALC: 45 % — SIGNIFICANT CHANGE UP (ref 39–50)
HGB BLD-MCNC: 15.3 G/DL — SIGNIFICANT CHANGE UP (ref 13–17)
IMM GRANULOCYTES NFR BLD AUTO: 0.6 % — SIGNIFICANT CHANGE UP (ref 0–1.5)
INR BLD: 1.1 RATIO — SIGNIFICANT CHANGE UP (ref 0.88–1.16)
LIDOCAIN IGE QN: 41 U/L — SIGNIFICANT CHANGE UP (ref 22–51)
LYMPHOCYTES # BLD AUTO: 3.35 K/UL — HIGH (ref 1–3.3)
LYMPHOCYTES # BLD AUTO: 41.7 % — SIGNIFICANT CHANGE UP (ref 13–44)
MAGNESIUM SERPL-MCNC: 2.3 MG/DL — SIGNIFICANT CHANGE UP (ref 1.6–2.6)
MCHC RBC-ENTMCNC: 29.4 PG — SIGNIFICANT CHANGE UP (ref 27–34)
MCHC RBC-ENTMCNC: 34 GM/DL — SIGNIFICANT CHANGE UP (ref 32–36)
MCV RBC AUTO: 86.5 FL — SIGNIFICANT CHANGE UP (ref 80–100)
MONOCYTES # BLD AUTO: 0.65 K/UL — SIGNIFICANT CHANGE UP (ref 0–0.9)
MONOCYTES NFR BLD AUTO: 8.1 % — SIGNIFICANT CHANGE UP (ref 2–14)
NEUTROPHILS # BLD AUTO: 3.29 K/UL — SIGNIFICANT CHANGE UP (ref 1.8–7.4)
NEUTROPHILS NFR BLD AUTO: 40.9 % — LOW (ref 43–77)
NT-PROBNP SERPL-SCNC: 7 PG/ML — SIGNIFICANT CHANGE UP (ref 0–300)
PLATELET # BLD AUTO: 315 K/UL — SIGNIFICANT CHANGE UP (ref 150–400)
POTASSIUM SERPL-MCNC: 4 MMOL/L — SIGNIFICANT CHANGE UP (ref 3.5–5.3)
POTASSIUM SERPL-SCNC: 4 MMOL/L — SIGNIFICANT CHANGE UP (ref 3.5–5.3)
PROT SERPL-MCNC: 7.3 G/DL — SIGNIFICANT CHANGE UP (ref 6.6–8.7)
PROTHROM AB SERPL-ACNC: 12.7 SEC — SIGNIFICANT CHANGE UP (ref 10.6–13.6)
RBC # BLD: 5.2 M/UL — SIGNIFICANT CHANGE UP (ref 4.2–5.8)
RBC # FLD: 11.9 % — SIGNIFICANT CHANGE UP (ref 10.3–14.5)
SODIUM SERPL-SCNC: 138 MMOL/L — SIGNIFICANT CHANGE UP (ref 135–145)
TROPONIN T SERPL-MCNC: <0.01 NG/ML — SIGNIFICANT CHANGE UP (ref 0–0.06)
WBC # BLD: 8.04 K/UL — SIGNIFICANT CHANGE UP (ref 3.8–10.5)
WBC # FLD AUTO: 8.04 K/UL — SIGNIFICANT CHANGE UP (ref 3.8–10.5)

## 2021-01-13 PROCEDURE — 71045 X-RAY EXAM CHEST 1 VIEW: CPT | Mod: 26

## 2021-01-13 PROCEDURE — 99285 EMERGENCY DEPT VISIT HI MDM: CPT

## 2021-01-13 PROCEDURE — 93010 ELECTROCARDIOGRAM REPORT: CPT

## 2021-01-13 NOTE — ED PROVIDER NOTE - PATIENT PORTAL LINK FT
You can access the FollowMyHealth Patient Portal offered by [facility] by registering at the following website: [email or web address]. By joining [employer]’s FollowMyHealth portal, you will also be able to view your health information using other applications (apps) compatible with our system.

## 2021-01-13 NOTE — ED PROVIDER NOTE - OBJECTIVE STATEMENT
39 y/o M pt with significant PMHx of COVID(9months ago) presents to the ED c/o difficulty breathing and back pain beginning 4 days ago. Pt notes negative COVID swab 1 week ago. He notes that symptoms are similar to when he had Covid the first time. Pt states that a lot of his coworkers tested positive recently so he got tested with rapid and PCR test both negative. Pt notes pleuritic back and chest pain.(not related to exertion). Pt also reporting dark urine and increased frequency for the last few days. No dysuria or hematuria.  Pt denies fevers/chills, ha, loc, focal neuro deficits, cp/sob/palp, cough, abd pain/n/v/d, family history or cardiac pain. urinary symptoms, recent travel and sick contacts.

## 2021-01-13 NOTE — ED PROVIDER NOTE - CLINICAL SUMMARY MEDICAL DECISION MAKING FREE TEXT BOX
41 y/o M pt with significant PMHx of COVID(9months ago) presents to the ED c/o difficulty breathing and back pain beginning 4 days ago. 39 y/o M pt with significant PMHx of COVID(9months ago) presents to the ED c/o difficulty breathing and back pain beginning 4 days ago. Pt low risk PE with neg ddimer, labs wnl, ekg nsr no acute ischemic changes, cxr wnl, low risk MACE heart score 0. Pt stable for dc with follow up.

## 2021-01-13 NOTE — ED ADULT TRIAGE NOTE - CHIEF COMPLAINT QUOTE
c/o difficulty breathing and pain in back, denies fever, cp weakness, or chills. pt states he had covid 9 months ago, was swabbed a week ago and was negative. sating 99% rm air, speaking in full sentences, ambulating in triage, no s/s of acute distress.

## 2021-01-13 NOTE — ED PROVIDER NOTE - NSFOLLOWUPINSTRUCTIONS_ED_ALL_ED_FT
Shortness of Breath, Adult    Shortness of breath is when a person has trouble breathing enough air, or when a person feels like she or he is having trouble breathing in enough air. Shortness of breath could be a sign of medical problem.     Follow these instructions at home:  Pay attention to any changes in your symptoms. Take these actions to help with your condition:    Do not smoke. Smoking is a common cause of shortness of breath. If you smoke and you need help quitting, ask your health care provider.  Avoid things that can irritate your airways, such as:  Mold.  Dust.  Air pollution.  Chemical fumes.  Things that can cause allergy symptoms (allergens), if you have allergies.  Keep your living space clean and free of mold and dust.  Rest as needed. Slowly return to your usual activities.  Take over-the-counter and prescription medicines, including oxygen and inhaled medicines, only as told by your health care provider.  Keep all follow-up visits as told by your health care provider. This is important.    Contact a health care provider if:  Your condition does not improve as soon as expected.  You have a hard time doing your normal activities, even after you rest.  You have new symptoms.    Get help right away if:  Your shortness of breath gets worse.  You have shortness of breath when you are resting.  You feel light-headed or you faint.  You have a cough that is not controlled with medicines.  You cough up blood.  You have pain with breathing.  You have pain in your chest, arms, shoulders, or abdomen.  You have a fever.  You cannot walk up stairs or exercise the way that you normally do.    ADDITIONAL NOTES AND INSTRUCTIONS    Por favor tenga seguimiento con mcdonnell doctor primario entre 2 elder.  Regrese a urgencias por cualquier preocupacion medica.

## 2021-01-14 VITALS
DIASTOLIC BLOOD PRESSURE: 65 MMHG | TEMPERATURE: 98 F | SYSTOLIC BLOOD PRESSURE: 125 MMHG | HEART RATE: 65 BPM | RESPIRATION RATE: 19 BRPM | OXYGEN SATURATION: 98 %

## 2021-01-14 LAB
APPEARANCE UR: ABNORMAL
BILIRUB UR-MCNC: NEGATIVE — SIGNIFICANT CHANGE UP
COLOR SPEC: YELLOW — SIGNIFICANT CHANGE UP
COMMENT - URINE: SIGNIFICANT CHANGE UP
D DIMER BLD IA.RAPID-MCNC: <150 NG/ML DDU — SIGNIFICANT CHANGE UP
DIFF PNL FLD: NEGATIVE — SIGNIFICANT CHANGE UP
EPI CELLS # UR: SIGNIFICANT CHANGE UP
GLUCOSE UR QL: NEGATIVE MG/DL — SIGNIFICANT CHANGE UP
KETONES UR-MCNC: NEGATIVE — SIGNIFICANT CHANGE UP
LEUKOCYTE ESTERASE UR-ACNC: NEGATIVE — SIGNIFICANT CHANGE UP
NITRITE UR-MCNC: NEGATIVE — SIGNIFICANT CHANGE UP
PH UR: 8 — SIGNIFICANT CHANGE UP (ref 5–8)
PROT UR-MCNC: NEGATIVE MG/DL — SIGNIFICANT CHANGE UP
SP GR SPEC: 1.01 — SIGNIFICANT CHANGE UP (ref 1.01–1.02)
UROBILINOGEN FLD QL: NEGATIVE MG/DL — SIGNIFICANT CHANGE UP

## 2021-01-14 PROCEDURE — 85379 FIBRIN DEGRADATION QUANT: CPT

## 2021-01-14 PROCEDURE — 85730 THROMBOPLASTIN TIME PARTIAL: CPT

## 2021-01-14 PROCEDURE — 83880 ASSAY OF NATRIURETIC PEPTIDE: CPT

## 2021-01-14 PROCEDURE — 83735 ASSAY OF MAGNESIUM: CPT

## 2021-01-14 PROCEDURE — 83690 ASSAY OF LIPASE: CPT

## 2021-01-14 PROCEDURE — 84484 ASSAY OF TROPONIN QUANT: CPT

## 2021-01-14 PROCEDURE — 85610 PROTHROMBIN TIME: CPT

## 2021-01-14 PROCEDURE — 87086 URINE CULTURE/COLONY COUNT: CPT

## 2021-01-14 PROCEDURE — 36415 COLL VENOUS BLD VENIPUNCTURE: CPT

## 2021-01-14 PROCEDURE — 80053 COMPREHEN METABOLIC PANEL: CPT

## 2021-01-14 PROCEDURE — 81001 URINALYSIS AUTO W/SCOPE: CPT

## 2021-01-14 PROCEDURE — 93005 ELECTROCARDIOGRAM TRACING: CPT

## 2021-01-14 PROCEDURE — 99284 EMERGENCY DEPT VISIT MOD MDM: CPT | Mod: 25

## 2021-01-14 PROCEDURE — 85025 COMPLETE CBC W/AUTO DIFF WBC: CPT

## 2021-01-14 PROCEDURE — 71045 X-RAY EXAM CHEST 1 VIEW: CPT

## 2021-01-15 LAB
CULTURE RESULTS: SIGNIFICANT CHANGE UP
SPECIMEN SOURCE: SIGNIFICANT CHANGE UP

## 2021-03-04 ENCOUNTER — EMERGENCY (EMERGENCY)
Facility: HOSPITAL | Age: 41
LOS: 1 days | Discharge: HOME | End: 2021-03-04
Attending: EMERGENCY MEDICINE
Payer: MEDICAID

## 2021-03-04 VITALS
WEIGHT: 177.91 LBS | SYSTOLIC BLOOD PRESSURE: 136 MMHG | OXYGEN SATURATION: 99 % | RESPIRATION RATE: 18 BRPM | HEIGHT: 65 IN | TEMPERATURE: 98 F | HEART RATE: 84 BPM | DIASTOLIC BLOOD PRESSURE: 86 MMHG

## 2021-03-04 DIAGNOSIS — Z90.49 ACQUIRED ABSENCE OF OTHER SPECIFIED PARTS OF DIGESTIVE TRACT: Chronic | ICD-10-CM

## 2021-03-04 LAB
ALBUMIN SERPL ELPH-MCNC: 4.4 G/DL — SIGNIFICANT CHANGE UP (ref 3.3–5.2)
ALP SERPL-CCNC: 83 U/L — SIGNIFICANT CHANGE UP (ref 40–120)
ALT FLD-CCNC: 41 U/L — HIGH
ANION GAP SERPL CALC-SCNC: 12 MMOL/L — SIGNIFICANT CHANGE UP (ref 5–17)
AST SERPL-CCNC: 38 U/L — SIGNIFICANT CHANGE UP
BASOPHILS # BLD AUTO: 0.04 K/UL — SIGNIFICANT CHANGE UP (ref 0–0.2)
BASOPHILS NFR BLD AUTO: 0.6 % — SIGNIFICANT CHANGE UP (ref 0–2)
BILIRUB SERPL-MCNC: 0.3 MG/DL — LOW (ref 0.4–2)
BUN SERPL-MCNC: 15 MG/DL — SIGNIFICANT CHANGE UP (ref 8–20)
CALCIUM SERPL-MCNC: 8.8 MG/DL — SIGNIFICANT CHANGE UP (ref 8.6–10.2)
CHLORIDE SERPL-SCNC: 103 MMOL/L — SIGNIFICANT CHANGE UP (ref 98–107)
CO2 SERPL-SCNC: 27 MMOL/L — SIGNIFICANT CHANGE UP (ref 22–29)
CREAT SERPL-MCNC: 0.66 MG/DL — SIGNIFICANT CHANGE UP (ref 0.5–1.3)
EOSINOPHIL # BLD AUTO: 0.53 K/UL — HIGH (ref 0–0.5)
EOSINOPHIL NFR BLD AUTO: 8 % — HIGH (ref 0–6)
GLUCOSE SERPL-MCNC: 116 MG/DL — HIGH (ref 70–99)
HCT VFR BLD CALC: 44.1 % — SIGNIFICANT CHANGE UP (ref 39–50)
HGB BLD-MCNC: 14.5 G/DL — SIGNIFICANT CHANGE UP (ref 13–17)
IMM GRANULOCYTES NFR BLD AUTO: 0.5 % — SIGNIFICANT CHANGE UP (ref 0–1.5)
LYMPHOCYTES # BLD AUTO: 2.58 K/UL — SIGNIFICANT CHANGE UP (ref 1–3.3)
LYMPHOCYTES # BLD AUTO: 39 % — SIGNIFICANT CHANGE UP (ref 13–44)
MCHC RBC-ENTMCNC: 28.6 PG — SIGNIFICANT CHANGE UP (ref 27–34)
MCHC RBC-ENTMCNC: 32.9 GM/DL — SIGNIFICANT CHANGE UP (ref 32–36)
MCV RBC AUTO: 87 FL — SIGNIFICANT CHANGE UP (ref 80–100)
MONOCYTES # BLD AUTO: 0.52 K/UL — SIGNIFICANT CHANGE UP (ref 0–0.9)
MONOCYTES NFR BLD AUTO: 7.9 % — SIGNIFICANT CHANGE UP (ref 2–14)
NEUTROPHILS # BLD AUTO: 2.92 K/UL — SIGNIFICANT CHANGE UP (ref 1.8–7.4)
NEUTROPHILS NFR BLD AUTO: 44 % — SIGNIFICANT CHANGE UP (ref 43–77)
PLATELET # BLD AUTO: 286 K/UL — SIGNIFICANT CHANGE UP (ref 150–400)
POTASSIUM SERPL-MCNC: 3.8 MMOL/L — SIGNIFICANT CHANGE UP (ref 3.5–5.3)
POTASSIUM SERPL-SCNC: 3.8 MMOL/L — SIGNIFICANT CHANGE UP (ref 3.5–5.3)
PROT SERPL-MCNC: 7.3 G/DL — SIGNIFICANT CHANGE UP (ref 6.6–8.7)
RBC # BLD: 5.07 M/UL — SIGNIFICANT CHANGE UP (ref 4.2–5.8)
RBC # FLD: 11.9 % — SIGNIFICANT CHANGE UP (ref 10.3–14.5)
SODIUM SERPL-SCNC: 141 MMOL/L — SIGNIFICANT CHANGE UP (ref 135–145)
WBC # BLD: 6.62 K/UL — SIGNIFICANT CHANGE UP (ref 3.8–10.5)
WBC # FLD AUTO: 6.62 K/UL — SIGNIFICANT CHANGE UP (ref 3.8–10.5)

## 2021-03-04 PROCEDURE — 99284 EMERGENCY DEPT VISIT MOD MDM: CPT

## 2021-03-04 PROCEDURE — 99284 EMERGENCY DEPT VISIT MOD MDM: CPT | Mod: 25

## 2021-03-04 PROCEDURE — 80053 COMPREHEN METABOLIC PANEL: CPT

## 2021-03-04 PROCEDURE — 70450 CT HEAD/BRAIN W/O DYE: CPT | Mod: 26,MA

## 2021-03-04 PROCEDURE — 96374 THER/PROPH/DIAG INJ IV PUSH: CPT

## 2021-03-04 PROCEDURE — 85025 COMPLETE CBC W/AUTO DIFF WBC: CPT

## 2021-03-04 PROCEDURE — 70450 CT HEAD/BRAIN W/O DYE: CPT

## 2021-03-04 PROCEDURE — 36415 COLL VENOUS BLD VENIPUNCTURE: CPT

## 2021-03-04 PROCEDURE — 93010 ELECTROCARDIOGRAM REPORT: CPT

## 2021-03-04 PROCEDURE — 93005 ELECTROCARDIOGRAM TRACING: CPT

## 2021-03-04 RX ORDER — ONDANSETRON 8 MG/1
4 TABLET, FILM COATED ORAL ONCE
Refills: 0 | Status: COMPLETED | OUTPATIENT
Start: 2021-03-04 | End: 2021-03-04

## 2021-03-04 RX ORDER — MECLIZINE HCL 12.5 MG
25 TABLET ORAL ONCE
Refills: 0 | Status: COMPLETED | OUTPATIENT
Start: 2021-03-04 | End: 2021-03-04

## 2021-03-04 RX ADMIN — Medication 25 MILLIGRAM(S): at 20:42

## 2021-03-04 RX ADMIN — ONDANSETRON 4 MILLIGRAM(S): 8 TABLET, FILM COATED ORAL at 20:42

## 2021-03-04 NOTE — ED STATDOCS - CLINICAL SUMMARY MEDICAL DECISION MAKING FREE TEXT BOX
Pt with dizziness/vertigo like symptoms, pt very symptomatic at this time, will check labs, head CT and reassess.

## 2021-03-04 NOTE — ED STATDOCS - OBJECTIVE STATEMENT
: Judith  41 y/o M with no PMHx presents to the ED c/o dizziness onset yesterday worse with movement associated with nausea. Pt also reports vomiting an  vomiting   Denies ear pain, fever,

## 2021-03-04 NOTE — ED STATDOCS - ATTENDING CONTRIBUTION TO CARE
I, Dr. Toribio, performed the initial face to face bedside interview with this patient regarding history of present illness, review of symptoms and relevant past medical, social and family history.  I completed an independent physical examination.  I was the initial provider who evaluated this patient. I have signed out the follow up of any pending tests (i.e. labs, radiological studies) to the ACP.  I have communicated the patient’s plan of care and disposition with the ACP.

## 2021-03-04 NOTE — ED ADULT NURSE NOTE - OBJECTIVE STATEMENT
Pt in no apparent distress at this time. Airway patent, breathing spontaneous and nonlabored. Pt A&Ox3 resting in stretcher. Pt c/o dizziness with nausea x2 days. Denies any other complaints.

## 2021-03-04 NOTE — ED STATDOCS - PROGRESS NOTE DETAILS
NADEEN De La Cruz NOTE: Pt evaluated at bedside. 40 M with dizziness since yesterday, worse with movement. No HA, no chest pain. Pt evaluated prior by intake physician. Otherwise HPI/PE/ROS as noted above. Will follow up plan per intake physician. Reports improvement in dizziness since given medication, able to ambulate steady gait. ED  Renetta Shultz NADEEN De La Cruz NOTE: CT no acute findings. Pt stable for d/c, reports improvement, VSS, tolerating PO, ambulatory.  Discussion includes results, plan, proper medication use/side effects, and return precautions. Pt advised to f/u with PMD 1-2 days and specialists discussed.  Printed copies of available lab/radiology results contained within discharge packet. Pt verbalized understanding/agreement of plan. ED  Renetta Shultz

## 2021-03-04 NOTE — ED STATDOCS - PATIENT PORTAL LINK FT
You can access the FollowMyHealth Patient Portal offered by [facility] by registering at the following website: [email or web address]. By joining [employer]’s FollowMyHealth portal, you will also be able to view your health information using other applications (apps) compatible with our system.

## 2021-03-04 NOTE — ED STATDOCS - NSFOLLOWUPCLINICS_GEN_ALL_ED_FT
[id number] [place] [address]  Phone: [phone]  Fax:   Follow Up Time: 1-3 Days

## 2021-03-04 NOTE — ED STATDOCS - NSFOLLOWUPINSTRUCTIONS_ED_ALL_ED_FT
- Anne un seguimiento con mcdonnell médico de atención primaria en 1 o 2 días. Si no puede hacer un seguimiento con mcdonnell médico de atención primaria, regrese al servicio de urgencias por cualquier problema urgente.  - Busque atención médica inmediata ante cualquier signo o síntoma nuevo, que empeore o que le preocupe.  - Rincon los medicamentos según las indicaciones, asegúrese de leer todas las instrucciones en el empaque  - Se le entregaron copias de todos los resultados de evelia pruebas, llévelas a mcdonnell médico de atención primaria para que revise los resultados anormales      ¡Sentirse mejor!  --------------  - Please follow up with your Primary Care Doctor in 1 - 2 days. If you cannot follow-up with your primary care doctor please return to the ED for any urgent issues.  - Seek immediate medical care for any new, worsening or concerning signs or symptoms.   - Take medications as directed, be sure to read all instructions on packaging  - You were given copies of all your test results, please bring to your primary care doctor for review of any abnormal test results      Feel better!

## 2021-03-04 NOTE — ED ADULT TRIAGE NOTE - CHIEF COMPLAINT QUOTE
pt with c/o dizziness like he is on a boat and upper back pain. both symptoms started yesterday around 2pm

## 2021-03-04 NOTE — ED STATDOCS - CARE PROVIDER_API CALL
Rigo Richardson; PhD)  Neurology; Vascular Neurology  [address]  [place] [address]  Phone: [phone]  Fax: [phone]  Follow Up Time: 1-3 Days

## 2021-03-05 RX ORDER — MECLIZINE HCL 12.5 MG
1 TABLET ORAL
Qty: 6 | Refills: 0
Start: 2021-03-05 | End: 2021-03-07

## 2022-01-13 ENCOUNTER — EMERGENCY (EMERGENCY)
Facility: HOSPITAL | Age: 42
LOS: 1 days | Discharge: HOME | End: 2022-01-13
Attending: EMERGENCY MEDICINE
Payer: MEDICAID

## 2022-01-13 VITALS
SYSTOLIC BLOOD PRESSURE: 116 MMHG | RESPIRATION RATE: 18 BRPM | TEMPERATURE: 98 F | HEIGHT: 65 IN | DIASTOLIC BLOOD PRESSURE: 68 MMHG | OXYGEN SATURATION: 75 % | HEART RATE: 75 BPM

## 2022-01-13 VITALS — OXYGEN SATURATION: 99 % | HEART RATE: 65 BPM

## 2022-01-13 DIAGNOSIS — Z90.49 ACQUIRED ABSENCE OF OTHER SPECIFIED PARTS OF DIGESTIVE TRACT: Chronic | ICD-10-CM

## 2022-01-13 LAB
ALBUMIN SERPL ELPH-MCNC: 4.4 G/DL — SIGNIFICANT CHANGE UP (ref 3.3–5.2)
ALP SERPL-CCNC: 82 U/L — SIGNIFICANT CHANGE UP (ref 40–120)
ALT FLD-CCNC: 75 U/L — HIGH
ANION GAP SERPL CALC-SCNC: 14 MMOL/L — SIGNIFICANT CHANGE UP (ref 5–17)
APPEARANCE UR: CLEAR — SIGNIFICANT CHANGE UP
AST SERPL-CCNC: 44 U/L — HIGH
BASOPHILS # BLD AUTO: 0.03 K/UL — SIGNIFICANT CHANGE UP (ref 0–0.2)
BASOPHILS NFR BLD AUTO: 0.4 % — SIGNIFICANT CHANGE UP (ref 0–2)
BILIRUB SERPL-MCNC: 0.5 MG/DL — SIGNIFICANT CHANGE UP (ref 0.4–2)
BILIRUB UR-MCNC: NEGATIVE — SIGNIFICANT CHANGE UP
BUN SERPL-MCNC: 11.2 MG/DL — SIGNIFICANT CHANGE UP (ref 8–20)
CALCIUM SERPL-MCNC: 9.3 MG/DL — SIGNIFICANT CHANGE UP (ref 8.6–10.2)
CHLORIDE SERPL-SCNC: 102 MMOL/L — SIGNIFICANT CHANGE UP (ref 98–107)
CO2 SERPL-SCNC: 26 MMOL/L — SIGNIFICANT CHANGE UP (ref 22–29)
COLOR SPEC: YELLOW — SIGNIFICANT CHANGE UP
CREAT SERPL-MCNC: 0.57 MG/DL — SIGNIFICANT CHANGE UP (ref 0.5–1.3)
DIFF PNL FLD: NEGATIVE — SIGNIFICANT CHANGE UP
EOSINOPHIL # BLD AUTO: 0.47 K/UL — SIGNIFICANT CHANGE UP (ref 0–0.5)
EOSINOPHIL NFR BLD AUTO: 7 % — HIGH (ref 0–6)
GLUCOSE SERPL-MCNC: 106 MG/DL — HIGH (ref 70–99)
GLUCOSE UR QL: NEGATIVE MG/DL — SIGNIFICANT CHANGE UP
HCT VFR BLD CALC: 46.8 % — SIGNIFICANT CHANGE UP (ref 39–50)
HGB BLD-MCNC: 15.5 G/DL — SIGNIFICANT CHANGE UP (ref 13–17)
HIV 1 & 2 AB SERPL IA.RAPID: SIGNIFICANT CHANGE UP
IMM GRANULOCYTES NFR BLD AUTO: 0.4 % — SIGNIFICANT CHANGE UP (ref 0–1.5)
KETONES UR-MCNC: NEGATIVE — SIGNIFICANT CHANGE UP
LEUKOCYTE ESTERASE UR-ACNC: NEGATIVE — SIGNIFICANT CHANGE UP
LIDOCAIN IGE QN: 30 U/L — SIGNIFICANT CHANGE UP (ref 22–51)
LYMPHOCYTES # BLD AUTO: 1.99 K/UL — SIGNIFICANT CHANGE UP (ref 1–3.3)
LYMPHOCYTES # BLD AUTO: 29.8 % — SIGNIFICANT CHANGE UP (ref 13–44)
MCHC RBC-ENTMCNC: 28.4 PG — SIGNIFICANT CHANGE UP (ref 27–34)
MCHC RBC-ENTMCNC: 33.1 GM/DL — SIGNIFICANT CHANGE UP (ref 32–36)
MCV RBC AUTO: 85.9 FL — SIGNIFICANT CHANGE UP (ref 80–100)
MONOCYTES # BLD AUTO: 0.45 K/UL — SIGNIFICANT CHANGE UP (ref 0–0.9)
MONOCYTES NFR BLD AUTO: 6.7 % — SIGNIFICANT CHANGE UP (ref 2–14)
NEUTROPHILS # BLD AUTO: 3.71 K/UL — SIGNIFICANT CHANGE UP (ref 1.8–7.4)
NEUTROPHILS NFR BLD AUTO: 55.7 % — SIGNIFICANT CHANGE UP (ref 43–77)
NITRITE UR-MCNC: NEGATIVE — SIGNIFICANT CHANGE UP
PH UR: 8 — SIGNIFICANT CHANGE UP (ref 5–8)
PLATELET # BLD AUTO: 280 K/UL — SIGNIFICANT CHANGE UP (ref 150–400)
POTASSIUM SERPL-MCNC: 4.3 MMOL/L — SIGNIFICANT CHANGE UP (ref 3.5–5.3)
POTASSIUM SERPL-SCNC: 4.3 MMOL/L — SIGNIFICANT CHANGE UP (ref 3.5–5.3)
PROT SERPL-MCNC: 7.5 G/DL — SIGNIFICANT CHANGE UP (ref 6.6–8.7)
PROT UR-MCNC: NEGATIVE — SIGNIFICANT CHANGE UP
RBC # BLD: 5.45 M/UL — SIGNIFICANT CHANGE UP (ref 4.2–5.8)
RBC # FLD: 12.1 % — SIGNIFICANT CHANGE UP (ref 10.3–14.5)
SARS-COV-2 RNA SPEC QL NAA+PROBE: DETECTED
SODIUM SERPL-SCNC: 142 MMOL/L — SIGNIFICANT CHANGE UP (ref 135–145)
SP GR SPEC: 1.01 — SIGNIFICANT CHANGE UP (ref 1.01–1.02)
UROBILINOGEN FLD QL: NEGATIVE MG/DL — SIGNIFICANT CHANGE UP
WBC # BLD: 6.68 K/UL — SIGNIFICANT CHANGE UP (ref 3.8–10.5)
WBC # FLD AUTO: 6.68 K/UL — SIGNIFICANT CHANGE UP (ref 3.8–10.5)

## 2022-01-13 PROCEDURE — 74177 CT ABD & PELVIS W/CONTRAST: CPT | Mod: ME

## 2022-01-13 PROCEDURE — 93010 ELECTROCARDIOGRAM REPORT: CPT

## 2022-01-13 PROCEDURE — 96375 TX/PRO/DX INJ NEW DRUG ADDON: CPT

## 2022-01-13 PROCEDURE — U0005: CPT

## 2022-01-13 PROCEDURE — 93005 ELECTROCARDIOGRAM TRACING: CPT

## 2022-01-13 PROCEDURE — 81003 URINALYSIS AUTO W/O SCOPE: CPT

## 2022-01-13 PROCEDURE — 86703 HIV-1/HIV-2 1 RESULT ANTBDY: CPT

## 2022-01-13 PROCEDURE — 85025 COMPLETE CBC W/AUTO DIFF WBC: CPT

## 2022-01-13 PROCEDURE — 80053 COMPREHEN METABOLIC PANEL: CPT

## 2022-01-13 PROCEDURE — 76705 ECHO EXAM OF ABDOMEN: CPT

## 2022-01-13 PROCEDURE — U0003: CPT

## 2022-01-13 PROCEDURE — 83690 ASSAY OF LIPASE: CPT

## 2022-01-13 PROCEDURE — 76705 ECHO EXAM OF ABDOMEN: CPT | Mod: 26,RT

## 2022-01-13 PROCEDURE — 74019 RADEX ABDOMEN 2 VIEWS: CPT

## 2022-01-13 PROCEDURE — 74177 CT ABD & PELVIS W/CONTRAST: CPT | Mod: 26,ME

## 2022-01-13 PROCEDURE — 74019 RADEX ABDOMEN 2 VIEWS: CPT | Mod: 26

## 2022-01-13 PROCEDURE — G1004: CPT

## 2022-01-13 PROCEDURE — 36415 COLL VENOUS BLD VENIPUNCTURE: CPT

## 2022-01-13 PROCEDURE — 99284 EMERGENCY DEPT VISIT MOD MDM: CPT

## 2022-01-13 PROCEDURE — 96374 THER/PROPH/DIAG INJ IV PUSH: CPT | Mod: XU

## 2022-01-13 PROCEDURE — 99284 EMERGENCY DEPT VISIT MOD MDM: CPT | Mod: 25

## 2022-01-13 RX ORDER — IOHEXOL 300 MG/ML
30 INJECTION, SOLUTION INTRAVENOUS ONCE
Refills: 0 | Status: COMPLETED | OUTPATIENT
Start: 2022-01-13 | End: 2022-01-13

## 2022-01-13 RX ORDER — ACETAMINOPHEN 500 MG
650 TABLET ORAL ONCE
Refills: 0 | Status: COMPLETED | OUTPATIENT
Start: 2022-01-13 | End: 2022-01-13

## 2022-01-13 RX ORDER — ONDANSETRON 8 MG/1
4 TABLET, FILM COATED ORAL ONCE
Refills: 0 | Status: COMPLETED | OUTPATIENT
Start: 2022-01-13 | End: 2022-01-13

## 2022-01-13 RX ORDER — MORPHINE SULFATE 50 MG/1
4 CAPSULE, EXTENDED RELEASE ORAL ONCE
Refills: 0 | Status: DISCONTINUED | OUTPATIENT
Start: 2022-01-13 | End: 2022-01-13

## 2022-01-13 RX ORDER — KETOROLAC TROMETHAMINE 30 MG/ML
15 SYRINGE (ML) INJECTION ONCE
Refills: 0 | Status: DISCONTINUED | OUTPATIENT
Start: 2022-01-13 | End: 2022-01-13

## 2022-01-13 RX ADMIN — ONDANSETRON 4 MILLIGRAM(S): 8 TABLET, FILM COATED ORAL at 08:12

## 2022-01-13 RX ADMIN — Medication 650 MILLIGRAM(S): at 15:36

## 2022-01-13 RX ADMIN — MORPHINE SULFATE 4 MILLIGRAM(S): 50 CAPSULE, EXTENDED RELEASE ORAL at 08:12

## 2022-01-13 RX ADMIN — IOHEXOL 30 MILLILITER(S): 300 INJECTION, SOLUTION INTRAVENOUS at 08:12

## 2022-01-13 RX ADMIN — Medication 15 MILLIGRAM(S): at 12:43

## 2022-01-13 NOTE — ED ADULT NURSE NOTE - NS ED NURSE RECORD ANOTHER VITAL SIGN
Diabetes Education  Author: Zarina Watson RD  Date: 6/15/2018    Diabetes Education Visit  Diabetes Education Record Assessment/Progress: Comprehensive/Group (2 mo f/u)    Diabetes Type  Diabetes Type : Type II (also on long term steroid)    Diabetes History  Diabetes Diagnosis: 0-1 year (reports dx this year, but hx of A1c 6.5% in 2012)    Nutrition  Meal Planning:  (usually 1 meal per day; on HD days will have extra snack prior to)  What type of beverages do you drink?: regular soda/tea, diet soda/tea  Meal Plan 24 Hour Recall - Dinner:  (meat, greens, rice)  Meal Plan 24 Hour Recall - Snack:  (snacking on lemonheads today (14gm for every 10 pc - a box has 100 pc); pickles)    Monitoring   Self Monitoring :  (hasn't checked - still confused on how to use meter (326 post-breakfast (snacking on lemon heads prior to appt))  Blood Glucose Logs: No  Do you use a personal glucose monitor?: No  In the last month, how often have you had a low blood sugar reaction?: never  Can you tell when your blood sugar is too high?: no    Exercise   Exercise Type: use exercise equipment, exercise class    Current Diabetes Treatment   Current Treatment:  (no DM meds)    Social History  Preferred Learning Method: Face to Face  Primary Support: Self, Spouse    Barriers to Change  Barriers to Change: None (motivation (3rd visit and still minimal changes) and multiple comorbidities)  Learning Challenges : None       Diabetes Education Assessment/Progress  Diabetes Disease Process (diabetes disease process and treatment options): Discussion, Individual Session (Reviewed disease process; reviewed effects of daily steroid use on BG's. Also discussed effects of dialysis and CHF in congruence with diabetes dx. Needs new A1c today.)    Nutrition (Incorporating nutritional management into one's lifestyle): Discussion, Needs Instruction, Needs Review, Individual Session, Written Materials Provided, Instructed (Still only eating once daily and  still snacking on pickles (though she reports less than usual). She has a box of lemonheads with her today, reports she has been snacking on about 10 or so this whole morning - BG of 326 reflects likely snacking on more than reported. Discussed need to choose snacks with <15 gm carb. Instructed on label reading. )    Medications (states correct name, dose, onset, peak, duration, side effects & timing of meds): Discussion, Individual Session, Comprehends Key Points (Remains off DM meds at this time, but based on BG of 326 will likely need to start meds soon. )    Monitoring (monitoring blood glucose/other parameters & using results): Discussion, Individual Session, Comprehends Key Points, Instructed, Demonstration, Return Demonstration, Written Materials Provided (Still did not understand how to use meter. She brought in her True[employer]rix meter today - trained on usage. Pt demonstrated testing BG without help from me today. Reviewed goal BG's in relation to meals.)    Acute Complications (preventing, detecting, and treating acute complications): Individual Session, Discussion (Not currently on DM meds, no true risk of hypo at this time.)    Chronic Complications (preventing, detecting, and treating chronic complications): Discussion, Individual Session, Instructed, Comprehends Key Points (Reviewed list of possible future complications, as well as worsening of ESRD and CHF with uncontrolled diabetes.)    Cognitive (knowledge of self-management skills, functional health literacy): Discussion, Individual Session (Pt has capability of attaining knowledge of DM lifestyle management - had motivational strides between 1st and 2nd visit, but minimal today. She displays knowledge of self-management skills and continues to work towards goals.)        Goals  Patient has selected/evaluated goals during today's session: Yes, evaluated  Healthy Eating: In Progress (Eliminate SSB and all pickles from diet - has eliminated SSB, but not  cornelio         Diabetes Care Plan/Intervention  Education Plan/Intervention: Individual Follow-Up DSMT (2 mo f/u scheduled)    Diabetes Meal Plan  Restrictions: Restricted Carbohydrate, Low Sodium, Low Potassium, Fluid Restriction    Education Units of Time   Time Spent: 30 min    Health Maintenance was reviewed today with patient. Discussed with patient importance of routine eye exams, foot exams/foot care, blood work (i.e.: A1c, microalbumin, and lipid), dental visits, yearly flu vaccine, and pneumonia vaccine as indicated by PCP. Patient verbalized understanding.     Health Maintenance Topics with due status: Not Due       Topic Last Completion Date    Colonoscopy 10/01/2011    Pneumococcal PPSV23 (Medium Risk) 06/14/2016    Pap Smear with HPV Cotest 08/16/2016    Influenza Vaccine 09/01/2016    DEXA SCAN 04/07/2017    Mammogram 08/25/2017    Lipid Panel 12/08/2017    Foot Exam 06/12/2018    Hemoglobin A1c 06/13/2018     Health Maintenance Due   Topic Date Due    Eye Exam  11/27/2016    TETANUS VACCINE  02/07/2018        Yes

## 2022-01-13 NOTE — ED ADULT NURSE NOTE - OBJECTIVE STATEMENT
Pt A&Ox4, NAD. Pt presents to the ED with complaints of epigastric pain and vomiting since this morning. Breathing even and unlabored.

## 2022-01-13 NOTE — ED ADULT TRIAGE NOTE - CHIEF COMPLAINT QUOTE
Presents to ED c/o sudden onset epigastric pain that radiates to the back. States it started this morning on his way to work. Also c/o N/V. Denies cardiac and GI PMHx. EKG done in triage. Presents to ED c/o sudden onset epigastric pain that radiates to the back. Describes it as a stabbing pain. States it started this morning on his way to work. Also c/o N/V. Denies cardiac and GI PMHx. EKG done in triage.

## 2022-01-13 NOTE — ED STATDOCS - PATIENT PORTAL LINK FT
You can access the FollowMyHealth Patient Portal offered by [facility] by registering at the following website: [email or web address]. By joining [employer]’s FollowMyHealth portal, you will also be able to view your health information using other applications (apps) compatible with our system.

## 2022-01-13 NOTE — ED STATDOCS - NS_ ATTENDINGSCRIBEDETAILS _ED_A_ED_FT
I, Anderson Catalan, performed the initial face to face bedside interview with this patient regarding history of present illness, review of symptoms and relevant past medical, social and family history.  I completed an independent physical examination.  I was the provider who initially evaluated this patient.  The history, relevant review of systems, past medical and surgical history, medical decision making, and physical examination was documented by the scribe in my presence and I attest to the accuracy of the documentation. Follow-up on ordered tests (ie labs, radiologic studies) and re-evaluation of the patient's status has been communicated to the ACP.  Disposition of the patient will be based on test outcome and response to ED interventions.

## 2022-01-13 NOTE — ED ADULT NURSE NOTE - CHIEF COMPLAINT QUOTE
Presents to ED c/o sudden onset epigastric pain that radiates to the back. Describes it as a stabbing pain. States it started this morning on his way to work. Also c/o N/V. Denies cardiac and GI PMHx. EKG done in triage.

## 2022-01-13 NOTE — ED STATDOCS - SKIN, MLM
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       Past Surgical History:   Procedure Laterality Date    BLADDER SUSPENSION  1962    BREAST BIOPSY      BREAST SURGERY      biopsy    COLPOCLEISIS N/A 3/21/2014    Performed by Soni Siddiqui MD at [facility], [place], operated by [facility] OR    CYSTOSCOPY N/A 3/21/2014    Performed by Soni Siddiqui MD at [facility], [place], operated by [facility] OR    CYSTOURETHROSCOPY  3/21/2014    HIP SURGERY      HYSTERECTOMY      TVH/ovaries remain (prolapse)    JOINT REPLACEMENT  2006    right hip  x4    PERINEORRHAPHY  3/21/2014    SUPRAPUBIC SLING (SPARC/BIOARC PROCEDURE) N/A 3/21/2014    Performed by Soni Siddiqui MD at [facility], [place], operated by [facility] OR    THYROIDECTOMY, PARTIAL      TOTLA COLPOCLEISIS WITH COLPECTOMY  3/21/2014    TRANSOBTURATOR TENSION-FREE MIDURETHRAL SLING  3/21/2014       Review of patient's allergies indicates:   Allergen Reactions    Tizanidine Other (See Comments)     Daughter report this medication incapable of functioning.    Cortisone      Other reaction(s): Flushing (skin)    Procaine      Other reaction(s): palpitations    Penicillins Rash       No current facility-administered medications on file prior to encounter.      Current Outpatient Medications on File Prior to Encounter   Medication Sig    alendronate (FOSAMAX) 70 MG tablet TAKE 1 TABLET(70 MG) BY MOUTH EVERY 7 DAYS (Patient taking differently: TAKE 1 TABLET(70 MG) BY MOUTH EVERY 7 DAYS ON Saturday )    atorvastatin (LIPITOR) 80 MG tablet Take 1 tablet (80 mg total) by mouth once daily.    calcium carbonate-vit D3-min 600 mg calcium-  400 unit Tab Take 1 tablet by mouth 2 (two) times daily.    cephALEXin (KEFLEX) 250 MG capsule TAKE 1 CAPSULE BY MOUTH ONCE DAILY    conjugated estrogens (PREMARIN) vaginal cream Use 0.5 -1 gram of estrogen cream in vagina twice a week    CRANBERRY FRUIT CONCENTRATE (AZO CRANBERRY ORAL) Take 2 tablets by mouth once daily.     escitalopram oxalate (LEXAPRO) 20 MG tablet Take 1 tablet (20 mg total) by mouth once daily.    FOLIC ACID/MULTIVIT-MIN/LUTEIN (CENTRUM SILVER ORAL) Take 1 tablet by mouth once daily.    gabapentin (NEURONTIN) 600 MG tablet TAKE 1 TABLET BY MOUTH 3 TIMES DAILY    lansoprazole (PREVACID) 30 MG capsule TAKE 1 CAPSULE BY MOUTH ONCE DAILY    levothyroxine (SYNTHROID) 75 MCG tablet TAKE 1 TABLET EVERY EVENING  AT  5PM    miconazole nitrate 2% (MICOTIN) 2 % Oint Apply topically 2 (two) times daily.    oxybutynin (DITROPAN-XL) 10 MG 24 hr tablet Take 1 tablet (10 mg total) by mouth every evening.    psyllium (METAMUCIL) 0.52 gram capsule Take 2 capsules by mouth once daily.    tramadol (ULTRAM) 50 mg tablet Take 1 tablet (50 mg total) by mouth every 8 (eight) hours as needed for Pain (Moderate Pain).    trolamine salicylate (ASPERCREME) 10 % cream Apply topically 2 (two) times daily as needed. Apply to knees PRN     Family History     Problem Relation (Age of Onset)    Asthma Mother    Breast cancer Mother    Cancer Maternal Grandmother        Tobacco Use    Smoking status: Never Smoker    Smokeless tobacco: Never Used   Substance and Sexual Activity    Alcohol use: No     Alcohol/week: 0.0 oz    Drug use: No    Sexual activity: Not Currently     Review of Systems   Constitutional: Negative for activity change, appetite change, chills, diaphoresis, fever and unexpected weight change.   HENT: Negative for congestion.    Eyes: Negative for visual disturbance.   Respiratory: Positive for cough (Chronic, >6 years), shortness of breath (Exertional) and wheezing. Negative for choking and  chest tightness.    Cardiovascular: Negative for chest pain, palpitations and leg swelling.   Gastrointestinal: Negative for abdominal distention, abdominal pain, anal bleeding, blood in stool, constipation, diarrhea, nausea, rectal pain and vomiting.   Musculoskeletal: Negative for arthralgias, back pain, joint swelling, neck pain and neck stiffness.   Skin: Negative for wound.   Neurological: Negative for headaches.   Psychiatric/Behavioral: Negative for sleep disturbance.     Objective:     Vital Signs (Most Recent):  Temp: 98 °F (36.7 °C) (01/06/19 2200)  Pulse: 79 (01/06/19 2200)  Resp: (!) 22 (01/06/19 2200)  BP: (!) 147/75 (01/06/19 2200)  SpO2: 95 % (01/06/19 2200) Vital Signs (24h Range):  Temp:  [98 °F (36.7 °C)-98.8 °F (37.1 °C)] 98 °F (36.7 °C)  Pulse:  [70-90] 79  Resp:  [16-24] 22  SpO2:  [92 %-98 %] 95 %  BP: ()/(50-75) 147/75     Weight: 89.6 kg (197 lb 9.6 oz)  Body mass index is 36.14 kg/m².    Physical Exam   Constitutional: She is oriented to person, place, and time. She appears well-developed. No distress.   HENT:   Head: Normocephalic and atraumatic.   Right Ear: External ear normal.   Left Ear: External ear normal.   Nose: Nose normal.   Eyes: EOM are normal. Pupils are equal, round, and reactive to light.   Neck: No JVD present. No tracheal deviation present.   Cardiovascular: Normal rate and regular rhythm.   No murmur heard.  Pulmonary/Chest: Effort normal. No stridor. No respiratory distress. She has wheezes (Coarse, diffuse). She has rales (RUL, absent otherwise). She exhibits no tenderness.   Abdominal: Soft. Bowel sounds are normal. She exhibits no distension. There is no tenderness. No hernia.   Musculoskeletal: She exhibits no edema.   Neurological: She is alert and oriented to person, place, and time. No cranial nerve deficit.   Skin: Capillary refill takes less than 2 seconds. No rash noted. She is not diaphoretic.   Psychiatric: She has a normal mood and affect. Judgment  normal.   Vitals reviewed.        CRANIAL NERVES     CN III, IV, VI   Pupils are equal, round, and reactive to light.  Extraocular motions are normal.        Significant Labs:   CBC:   Recent Labs   Lab 01/06/19  1430   WBC 4.69   HGB 6.7*   HCT 26.0*        CMP:   Recent Labs   Lab 01/06/19  1430      K 3.9      CO2 26   *   BUN 8   CREATININE 0.7   CALCIUM 9.1   PROT 7.1   ALBUMIN 3.2*   BILITOT 0.3   ALKPHOS 86   AST 28   ALT 13   ANIONGAP 10   EGFRNONAA >60.0       Significant Imaging: I have reviewed all pertinent imaging results/findings within the past 24 hours.   No Jaundice

## 2022-01-13 NOTE — ED STATDOCS - OBJECTIVE STATEMENT
40 y/o male with a PMHx of appendectomy, presents to the ED c/o sudden onset of epigastric pain that radiates to the back that began after drinking green tea this morning. Described as a pressure. Reports 2 episodes of vomiting green. Denies nausea. Pt states 1 hour ago he had abdominal bloating which has now improved. Denies BM today. Last BM yesterday morning. +Flatus today. Denies taking medications for pain. Pt reports hx of similar symptoms 6 months ago, saw his PCP and was told it was likely acid and digestion, pt was prescribed Mylanta. Denies hx of tobacco use or EtOH use. Not vaccinated for covid. Denies cough, congestion, loss of taste or smell.   PCP: Dr. Retana 42 y/o male with a PMHx of appendectomy, presents to the ED c/o sudden onset of epigastric pain that radiates to the back that began after drinking green tea this morning. Described as a pressure. Reports 2 episodes of vomiting- notes green color. Denies nausea. Pt states 1 hour ago he had abdominal bloating which has now improved. Denies BM today. Last BM yesterday morning. +Flatus today. Denies taking medications for pain. Pt reports hx of similar symptoms 6 months ago, saw his PCP and was told it was likely acid and digestion, pt was prescribed Mylanta. Denies hx of tobacco use or EtOH use. Not vaccinated for covid. Denies cough, congestion, loss of taste or smell.   PCP: Dr. Retana 40 y/o male with a PMHx of appendectomy, presents to the ED c/o sudden onset of epigastric pain that radiates to the back that began after drinking green tea this morning. Described as a pressure. Reports 2 episodes of vomiting this morning- notes green color. Denies nausea. Pt had abdominal bloating 1 hour ago which has now improved per pt. Denies BM today. Last BM was yesterday morning. +Flatus today. Denies taking medications for pain. Pt reports hx of similar symptoms 6 months ago, saw his PCP and was told it was likely acid and digestion, pt was prescribed Mylanta. Denies hx of tobacco use or EtOH use. Not vaccinated against covid. Denies cough, congestion, or loss of taste or smell.   PCP: Dr. Retana

## 2022-01-13 NOTE — ED STATDOCS - GASTROINTESTINAL, MLM
Abdomen soft, dull to percussion and non-distended. (+)Nonlocalized upper abdominal tenderness. No rebound, rigidity, or guarding.

## 2022-01-13 NOTE — ED STATDOCS - CLINICAL SUMMARY MEDICAL DECISION MAKING FREE TEXT BOX
Abdominal pain. Possible bowel obstruction vs cholecystitis vs pancreatitis. Will obtain labs, x-ray, CT, +/- US.

## 2022-01-13 NOTE — ED STATDOCS - PROGRESS NOTE DETAILS
reviewed ct results lab work ultrasound pt covid positive  abd soft nontender  pt to quarantine at h ome

## 2022-01-29 ENCOUNTER — EMERGENCY (EMERGENCY)
Facility: HOSPITAL | Age: 42
LOS: 1 days | Discharge: HOME | End: 2022-01-29
Attending: EMERGENCY MEDICINE
Payer: MEDICAID

## 2022-01-29 VITALS
HEART RATE: 77 BPM | RESPIRATION RATE: 18 BRPM | DIASTOLIC BLOOD PRESSURE: 62 MMHG | OXYGEN SATURATION: 98 % | TEMPERATURE: 98 F | WEIGHT: 169.98 LBS | HEIGHT: 65 IN | SYSTOLIC BLOOD PRESSURE: 105 MMHG

## 2022-01-29 DIAGNOSIS — Z90.49 ACQUIRED ABSENCE OF OTHER SPECIFIED PARTS OF DIGESTIVE TRACT: Chronic | ICD-10-CM

## 2022-01-29 LAB
ALBUMIN SERPL ELPH-MCNC: 4.7 G/DL — SIGNIFICANT CHANGE UP (ref 3.3–5.2)
ALP SERPL-CCNC: 88 U/L — SIGNIFICANT CHANGE UP (ref 40–120)
ALT FLD-CCNC: 27 U/L — SIGNIFICANT CHANGE UP
ANION GAP SERPL CALC-SCNC: 14 MMOL/L — SIGNIFICANT CHANGE UP (ref 5–17)
APPEARANCE UR: CLEAR — SIGNIFICANT CHANGE UP
AST SERPL-CCNC: 20 U/L — SIGNIFICANT CHANGE UP
BACTERIA # UR AUTO: ABNORMAL
BASOPHILS # BLD AUTO: 0.05 K/UL — SIGNIFICANT CHANGE UP (ref 0–0.2)
BASOPHILS NFR BLD AUTO: 0.7 % — SIGNIFICANT CHANGE UP (ref 0–2)
BILIRUB SERPL-MCNC: 0.6 MG/DL — SIGNIFICANT CHANGE UP (ref 0.4–2)
BILIRUB UR-MCNC: NEGATIVE — SIGNIFICANT CHANGE UP
BUN SERPL-MCNC: 14.3 MG/DL — SIGNIFICANT CHANGE UP (ref 8–20)
CALCIUM SERPL-MCNC: 9.5 MG/DL — SIGNIFICANT CHANGE UP (ref 8.6–10.2)
CHLORIDE SERPL-SCNC: 103 MMOL/L — SIGNIFICANT CHANGE UP (ref 98–107)
CO2 SERPL-SCNC: 23 MMOL/L — SIGNIFICANT CHANGE UP (ref 22–29)
COLOR SPEC: YELLOW — SIGNIFICANT CHANGE UP
CREAT SERPL-MCNC: 0.62 MG/DL — SIGNIFICANT CHANGE UP (ref 0.5–1.3)
DIFF PNL FLD: NEGATIVE — SIGNIFICANT CHANGE UP
EOSINOPHIL # BLD AUTO: 0.63 K/UL — HIGH (ref 0–0.5)
EOSINOPHIL NFR BLD AUTO: 9.3 % — HIGH (ref 0–6)
GLUCOSE SERPL-MCNC: 108 MG/DL — HIGH (ref 70–99)
GLUCOSE UR QL: NEGATIVE MG/DL — SIGNIFICANT CHANGE UP
HCT VFR BLD CALC: 45.1 % — SIGNIFICANT CHANGE UP (ref 39–50)
HGB BLD-MCNC: 15.3 G/DL — SIGNIFICANT CHANGE UP (ref 13–17)
IMM GRANULOCYTES NFR BLD AUTO: 0.4 % — SIGNIFICANT CHANGE UP (ref 0–1.5)
KETONES UR-MCNC: NEGATIVE — SIGNIFICANT CHANGE UP
LEUKOCYTE ESTERASE UR-ACNC: NEGATIVE — SIGNIFICANT CHANGE UP
LIDOCAIN IGE QN: 35 U/L — SIGNIFICANT CHANGE UP (ref 22–51)
LYMPHOCYTES # BLD AUTO: 2.43 K/UL — SIGNIFICANT CHANGE UP (ref 1–3.3)
LYMPHOCYTES # BLD AUTO: 36.1 % — SIGNIFICANT CHANGE UP (ref 13–44)
MCHC RBC-ENTMCNC: 29 PG — SIGNIFICANT CHANGE UP (ref 27–34)
MCHC RBC-ENTMCNC: 33.9 GM/DL — SIGNIFICANT CHANGE UP (ref 32–36)
MCV RBC AUTO: 85.6 FL — SIGNIFICANT CHANGE UP (ref 80–100)
MONOCYTES # BLD AUTO: 0.59 K/UL — SIGNIFICANT CHANGE UP (ref 0–0.9)
MONOCYTES NFR BLD AUTO: 8.8 % — SIGNIFICANT CHANGE UP (ref 2–14)
NEUTROPHILS # BLD AUTO: 3.01 K/UL — SIGNIFICANT CHANGE UP (ref 1.8–7.4)
NEUTROPHILS NFR BLD AUTO: 44.7 % — SIGNIFICANT CHANGE UP (ref 43–77)
NITRITE UR-MCNC: NEGATIVE — SIGNIFICANT CHANGE UP
PH UR: 6 — SIGNIFICANT CHANGE UP (ref 5–8)
PLATELET # BLD AUTO: 308 K/UL — SIGNIFICANT CHANGE UP (ref 150–400)
POTASSIUM SERPL-MCNC: 4.2 MMOL/L — SIGNIFICANT CHANGE UP (ref 3.5–5.3)
POTASSIUM SERPL-SCNC: 4.2 MMOL/L — SIGNIFICANT CHANGE UP (ref 3.5–5.3)
PROT SERPL-MCNC: 7.6 G/DL — SIGNIFICANT CHANGE UP (ref 6.6–8.7)
PROT UR-MCNC: NEGATIVE — SIGNIFICANT CHANGE UP
RBC # BLD: 5.27 M/UL — SIGNIFICANT CHANGE UP (ref 4.2–5.8)
RBC # FLD: 12 % — SIGNIFICANT CHANGE UP (ref 10.3–14.5)
SODIUM SERPL-SCNC: 139 MMOL/L — SIGNIFICANT CHANGE UP (ref 135–145)
SP GR SPEC: 1.02 — SIGNIFICANT CHANGE UP (ref 1.01–1.02)
UROBILINOGEN FLD QL: NEGATIVE MG/DL — SIGNIFICANT CHANGE UP
WBC # BLD: 6.74 K/UL — SIGNIFICANT CHANGE UP (ref 3.8–10.5)
WBC # FLD AUTO: 6.74 K/UL — SIGNIFICANT CHANGE UP (ref 3.8–10.5)

## 2022-01-29 PROCEDURE — 83690 ASSAY OF LIPASE: CPT

## 2022-01-29 PROCEDURE — 99284 EMERGENCY DEPT VISIT MOD MDM: CPT

## 2022-01-29 PROCEDURE — 99284 EMERGENCY DEPT VISIT MOD MDM: CPT | Mod: 25

## 2022-01-29 PROCEDURE — 36415 COLL VENOUS BLD VENIPUNCTURE: CPT

## 2022-01-29 PROCEDURE — 81001 URINALYSIS AUTO W/SCOPE: CPT

## 2022-01-29 PROCEDURE — 80053 COMPREHEN METABOLIC PANEL: CPT

## 2022-01-29 PROCEDURE — 87086 URINE CULTURE/COLONY COUNT: CPT

## 2022-01-29 PROCEDURE — 96374 THER/PROPH/DIAG INJ IV PUSH: CPT

## 2022-01-29 PROCEDURE — 85025 COMPLETE CBC W/AUTO DIFF WBC: CPT

## 2022-01-29 RX ORDER — SUCRALFATE 1 G
1 TABLET ORAL ONCE
Refills: 0 | Status: DISCONTINUED | OUTPATIENT
Start: 2022-01-29 | End: 2022-02-02

## 2022-01-29 RX ORDER — METHOCARBAMOL 500 MG/1
1500 TABLET, FILM COATED ORAL ONCE
Refills: 0 | Status: COMPLETED | OUTPATIENT
Start: 2022-01-29 | End: 2022-01-29

## 2022-01-29 RX ORDER — LIDOCAINE 4 G/100G
1 CREAM TOPICAL ONCE
Refills: 0 | Status: COMPLETED | OUTPATIENT
Start: 2022-01-29 | End: 2022-01-29

## 2022-01-29 RX ORDER — SODIUM CHLORIDE 9 MG/ML
1000 INJECTION INTRAMUSCULAR; INTRAVENOUS; SUBCUTANEOUS ONCE
Refills: 0 | Status: COMPLETED | OUTPATIENT
Start: 2022-01-29 | End: 2022-01-29

## 2022-01-29 RX ORDER — FAMOTIDINE 10 MG/ML
20 INJECTION INTRAVENOUS ONCE
Refills: 0 | Status: DISCONTINUED | OUTPATIENT
Start: 2022-01-29 | End: 2022-02-02

## 2022-01-29 RX ORDER — KETOROLAC TROMETHAMINE 30 MG/ML
15 SYRINGE (ML) INJECTION ONCE
Refills: 0 | Status: DISCONTINUED | OUTPATIENT
Start: 2022-01-29 | End: 2022-01-29

## 2022-01-29 RX ADMIN — LIDOCAINE 1 PATCH: 4 CREAM TOPICAL at 05:28

## 2022-01-29 RX ADMIN — Medication 15 MILLIGRAM(S): at 05:28

## 2022-01-29 RX ADMIN — SODIUM CHLORIDE 1000 MILLILITER(S): 9 INJECTION INTRAMUSCULAR; INTRAVENOUS; SUBCUTANEOUS at 05:29

## 2022-01-29 RX ADMIN — METHOCARBAMOL 1500 MILLIGRAM(S): 500 TABLET, FILM COATED ORAL at 05:28

## 2022-01-29 NOTE — ED ADULT NURSE NOTE - OBJECTIVE STATEMENT
Assumed care of the Pt AOx4 in no acute distress. Pt complaining of ABD and flank pain worse on the right side with N/V/D. Pt Denies any fevers, chills, or SOB. Pt meds given as per orders, labs sent as per orders pt educated on plan of care, pt able to successfully teach back plan of care to RN, RN will continue to reeducate pt during hospital stay.

## 2022-01-29 NOTE — ED ADULT TRIAGE NOTE - CHIEF COMPLAINT QUOTE
patient states that he started having abdominal pain and back pain started 3 weeks ago. went away and now back tonight

## 2022-01-29 NOTE — ED PROVIDER NOTE - NSFOLLOWUPINSTRUCTIONS_ED_ALL_ED_FT
Follow up with PCP within 1 week   Follow up with GI within 1 week     return if new or worsening symptoms       Abdominal Pain    Many things can cause abdominal pain. Many times, abdominal pain is not caused by a disease and will improve without treatment. Your health care provider will do a physical exam to determine if there is a dangerous cause of your pain; blood tests and imaging may help determine the cause of your pain. However, in many cases, no cause may be found and you may need further testing as an outpatient. Monitor your abdominal pain for any changes.     SEEK IMMEDIATE MEDICAL CARE IF YOU HAVE ANY OF THE FOLLOWING SYMPTOMS: worsening abdominal pain, uncontrollable vomiting, profuse diarrhea, inability to have bowel movements or pass gas, black or bloody stools, fever accompanying chest pain or back pain, or fainting. These symptoms may represent a serious problem that is an emergency. Do not wait to see if the symptoms will go away. Get medical help right away. Call 911 and do not drive yourself to the hospital.

## 2022-01-29 NOTE — ED PROVIDER NOTE - ATTENDING CONTRIBUTION TO CARE
well appearing adult male with abd and back pain; recent visit with normal imaging; on exam, NAD, well appearing, no icterus; normal heart and lung sounds; abd benign; no cva ttp; no skin rashes/lesions; labs reviewed, tolerating PO without difficulty after ED treamtnet; agree with acp plan of care

## 2022-01-29 NOTE — ED PROVIDER NOTE - OBJECTIVE STATEMENT
41 year old male with no med hx presents c/o flank pain and abd pain x 1 day. Pt endorses abd pain worse on right side, radiating to right back. he also admits to nausea, vomiting. 41 year old male with no med hx presents c/o flank pain and abd pain x 1 day. Pt endorses abd pain worse on right side, radiating to right back. he also admits to nausea, vomiting, diarrhea. he denies fever/chills, hx of kidney stones. 41 year old male with no med hx presents c/o flank pain and abd pain x 1 day. Pt endorses abd pain worse on right side, radiating to right back. he also admits to nausea, vomiting, diarrhea. he denies fever/chills, hx of kidney stones.    he had similar presentation 1/13 with full workup including CT and US.

## 2022-01-29 NOTE — ED PROVIDER NOTE - CARE PROVIDER_API CALL
Hany Tony)  Gastroenterology; Internal Medicine  [address] [facility], [facility] [address]  [place] [address]  Phone: [phone]  Fax: [phone]  Follow Up Time:

## 2022-01-29 NOTE — ED PROVIDER NOTE - PHYSICAL EXAMINATION
+ RUQ tenderness   + right paraspinal msk pain reproducible on exam + right paraspinal msk tenderness reproducible with movement

## 2022-01-29 NOTE — ED PROVIDER NOTE - CLINICAL SUMMARY MEDICAL DECISION MAKING FREE TEXT BOX
41 year old male with no med hx presents c/o flank pain and abd pain x 1 day. meds, labs, u/s 41 year old male with no med hx presents c/o flank pain and abd pain x 1 day. meds, will check labs for electrolyte abnormalities, likely msk

## 2022-01-29 NOTE — ED PROVIDER NOTE - PATIENT PORTAL LINK FT
You can access the FollowMyHealth Patient Portal offered by [facility] by registering at the following website: [email or web address]. By joining [employer]’s FollowMyHealth portal, you will also be able to view your health information using other applications (apps) compatible with our system.

## 2022-01-30 LAB
CULTURE RESULTS: NO GROWTH — SIGNIFICANT CHANGE UP
SPECIMEN SOURCE: SIGNIFICANT CHANGE UP

## 2023-02-23 NOTE — ED ADULT NURSE NOTE - EXTENSIONS OF SELF_ADULT
Care Transitions Initial Follow Up Call    Outreach made within 2 business days of discharge: Yes    Patient: Mercedes Lazo Patient : 1953   MRN: [id number]  Reason for Admission: There are no discharge diagnoses documented for the most recent discharge. Discharge Date: 23       Spoke with: Reina Richards     Discharge department/facility: [facility]    TCM Interactive Patient Contact:  Was patient able to fill all prescriptions: Yes  Was patient instructed to bring all medications to the follow-up visit: Yes  Is patient taking all medications as directed in the discharge summary?  Yes  Does patient understand their discharge instructions: Yes  Does patient have questions or concerns that need addressed prior to 7-14 day follow up office visit: no    Scheduled appointment with PCP within 7-14 days    Follow Up  Future Appointments   Date Time Provider Nahid Rick   3/2/2023 10:00 AM YANN Santillan - [address] [place]   3/22/2023 11:30 AM DO Tico Baltazar  [address] [place] [address]
None

## 2023-07-12 NOTE — ED ADULT NURSE NOTE - NS ED NURSE LEVEL OF CONSCIOUSNESS MENTAL STATUS
Alert/Awake/Cooperative
Call [phone] or search [employer] Data on Aging online. You need 6139-0059 mg of calcium and 4506-6100 IU of vitamin D per day. It is possible to meet your calcium requirement with diet alone, but a vitamin D supplement is usually necessary to meet this goal.  When exposed to the sun, use a sunscreen that protects against both UVA and UVB radiation with an SPF of 30 or greater. Reapply every 2 to 3 hours or after sweating, drying off with a towel, or swimming. Always wear a seat belt when traveling in a car. Always wear a helmet when riding a bicycle or motorcycle.

## 2023-12-02 NOTE — ED ADULT TRIAGE NOTE - STATUS:
HEALTH ISSUES - PROBLEM Dx:    Protocol: [id number]    Interval History: No acute events overnight.    Review of Systems:  General: no fevers, chills, fatigue  HEENT: no runny nose, sore throat, mouth sores  Resp: no cough, SOB  CV: no cyanosis  GI: no N/V/D, no abdominal pain  : no dysuria, no hematuria  MSK: no bone pain  Heme/Lymph: no abnormal bleeding  Skin: no rash     Allergies    pegaspargase (Vomiting; Other (Mod to Severe); Nausea; Swelling)    Intolerances        MEDICATIONS  (STANDING):  chlorhexidine 0.12% Oral Liquid - Peds 15 milliLiter(s) Swish and Spit three times a day  clotrimazole  Oral Lozenge - Peds 1 Lozenge Oral two times a day  dextrose 5% + sodium chloride 0.45% - Pediatric 1000 milliLiter(s) (235 mL/Hr) IV Continuous <Continuous>  dextrose 5% + sodium chloride 0.45% - Pediatric 1000 milliLiter(s) (235 mL/Hr) IV Continuous <Continuous>  famotidine IV Intermittent - Peds 18 milliGRAM(s) IV Intermittent every 12 hours  LORazepam  Oral Liquid - Peds 1 milliGRAM(s) Oral every 8 hours  mercaptopurine Oral Tab/Cap - Peds 50 milliGRAM(s) Oral <User Schedule>  OLANZapine  Oral Tab/Cap - Peds 5 milliGRAM(s) Oral at bedtime  palonosetron IV Intermittent - Peds 1440 MICROGram(s) IV Intermittent every 48 hours    MEDICATIONS  (PRN):  furosemide  IV Intermittent - Peds 20 milliGRAM(s) IV Intermittent once PRN UO < 185mL/hour averaged over 4 hours after start fo HD MTX infusion  hydrOXYzine IV Intermittent - Peds. 36 milliGRAM(s) IV Intermittent every 6 hours PRN nausea and vomiting  polyethylene glycol 3350 Oral Powder - Peds 17 Gram(s) Oral two times a day PRN for constipation  senna 8.6 milliGRAM(s) Oral Tablet - Peds 1 Tablet(s) Oral two times a day PRN for constipation  sodium bicarbonate 8.4% IV Intermittent - Peds 47 milliEquivalent(s) IV Intermittent every 6 hours PRN urine pH < 7 on two consecutive UA        PATIENT CARE ACCESS  [] Peripheral IV  [] Central Venous Line	  [] PICC, Date Placed:		  [] Broviac – __ Lumen, Date Placed:  [] Mediport, Date Placed:		  [] Urinary Catheter, Date Placed:  [x] Necessity of urinary, arterial, and venous catheters discussed    Vital Signs Last 24 Hrs  T(C): 36.9 (02 Dec 2023 18:15), Max: 37 (01 Dec 2023 22:15)  T(F): 98.4 (02 Dec 2023 18:15), Max: 98.6 (01 Dec 2023 22:15)  HR: 81 (02 Dec 2023 18:15) (63 - 81)  BP: 115/74 (02 Dec 2023 18:15) (95/58 - 115/74)  BP(mean): --  RR: 18 (02 Dec 2023 18:15) (18 - 18)  SpO2: 99% (02 Dec 2023 18:15) (98% - 100%)    Parameters below as of 02 Dec 2023 18:15  Patient On (Oxygen Delivery Method): room air        I&O's Detail    01 Dec 2023 07:01  -  02 Dec 2023 07:00  --------------------------------------------------------  IN:    dextrose 5% + sodium chloride 0.45% (w/ Additives) - Pediatric: 1835 mL    IV PiggyBack: 1500 mL    IV PiggyBack: 102 mL    IV PiggyBack: 82 mL    IV PiggyBack: 55 mL    IV PiggyBack: 2351 mL    Oral Fluid: 480 mL  Total IN: 6405 mL    OUT:    Voided (mL): 5350 mL  Total OUT: 5350 mL    Total NET: 1055 mL      02 Dec 2023 07:01  -  02 Dec 2023 19:49  --------------------------------------------------------  IN:    IV PiggyBack: 2570 mL  Total IN: 2570 mL    OUT:    Voided (mL): 2775 mL  Total OUT: 2775 mL    Total NET: -205 mL      PHYSICAL EXAM:  Constitutional: well-appearing, NAD  HEENT: no scleral icterus, MMM, no mouth sores  Respiratory: breathing comfortably, CTA b/l  Cardiovascular: RRR, no m/r/g, distal pulses intact, cap refill < 2sec  Gastrointestinal: BS normal, soft, NT, ND, no HSM  Neurological: awake and alert, no focal deficits  Skin: no rashes or lesions, mediport in place  Lymph Nodes: no cervical, supraclavicular, axillary, or inguinal LAD noted  Musculoskeletal: FROM in all extremities, no deformities        Lab Results:                MICROBIOLOGY/CULTURES:    RADIOLOGY RESULTS:   Applied

## 2023-12-15 ENCOUNTER — EMERGENCY (EMERGENCY)
Facility: HOSPITAL | Age: 43
LOS: 1 days | Discharge: LEFT BEFORE DISCHARGE | End: 2023-12-15
Attending: EMERGENCY MEDICINE
Payer: MEDICAID

## 2023-12-15 VITALS
DIASTOLIC BLOOD PRESSURE: 71 MMHG | TEMPERATURE: 99 F | WEIGHT: 167.55 LBS | HEIGHT: 65 IN | HEART RATE: 76 BPM | RESPIRATION RATE: 18 BRPM | SYSTOLIC BLOOD PRESSURE: 110 MMHG | OXYGEN SATURATION: 99 %

## 2023-12-15 VITALS
HEART RATE: 76 BPM | OXYGEN SATURATION: 99 % | DIASTOLIC BLOOD PRESSURE: 71 MMHG | RESPIRATION RATE: 18 BRPM | TEMPERATURE: 99 F | SYSTOLIC BLOOD PRESSURE: 110 MMHG

## 2023-12-15 DIAGNOSIS — Z90.49 ACQUIRED ABSENCE OF OTHER SPECIFIED PARTS OF DIGESTIVE TRACT: Chronic | ICD-10-CM

## 2023-12-15 LAB
ALBUMIN SERPL ELPH-MCNC: 4.4 G/DL — SIGNIFICANT CHANGE UP (ref 3.3–5.2)
ALBUMIN SERPL ELPH-MCNC: 4.4 G/DL — SIGNIFICANT CHANGE UP (ref 3.3–5.2)
ALP SERPL-CCNC: 76 U/L — SIGNIFICANT CHANGE UP (ref 40–120)
ALP SERPL-CCNC: 76 U/L — SIGNIFICANT CHANGE UP (ref 40–120)
ALT FLD-CCNC: 28 U/L — SIGNIFICANT CHANGE UP
ALT FLD-CCNC: 28 U/L — SIGNIFICANT CHANGE UP
ANION GAP SERPL CALC-SCNC: 10 MMOL/L — SIGNIFICANT CHANGE UP (ref 5–17)
ANION GAP SERPL CALC-SCNC: 10 MMOL/L — SIGNIFICANT CHANGE UP (ref 5–17)
APTT BLD: 31.8 SEC — SIGNIFICANT CHANGE UP (ref 24.5–35.6)
APTT BLD: 31.8 SEC — SIGNIFICANT CHANGE UP (ref 24.5–35.6)
AST SERPL-CCNC: 29 U/L — SIGNIFICANT CHANGE UP
AST SERPL-CCNC: 29 U/L — SIGNIFICANT CHANGE UP
BASOPHILS # BLD AUTO: 0.04 K/UL — SIGNIFICANT CHANGE UP (ref 0–0.2)
BASOPHILS # BLD AUTO: 0.04 K/UL — SIGNIFICANT CHANGE UP (ref 0–0.2)
BASOPHILS NFR BLD AUTO: 0.5 % — SIGNIFICANT CHANGE UP (ref 0–2)
BASOPHILS NFR BLD AUTO: 0.5 % — SIGNIFICANT CHANGE UP (ref 0–2)
BILIRUB SERPL-MCNC: 0.4 MG/DL — SIGNIFICANT CHANGE UP (ref 0.4–2)
BILIRUB SERPL-MCNC: 0.4 MG/DL — SIGNIFICANT CHANGE UP (ref 0.4–2)
BUN SERPL-MCNC: 9.8 MG/DL — SIGNIFICANT CHANGE UP (ref 8–20)
BUN SERPL-MCNC: 9.8 MG/DL — SIGNIFICANT CHANGE UP (ref 8–20)
CALCIUM SERPL-MCNC: 9 MG/DL — SIGNIFICANT CHANGE UP (ref 8.4–10.5)
CALCIUM SERPL-MCNC: 9 MG/DL — SIGNIFICANT CHANGE UP (ref 8.4–10.5)
CHLORIDE SERPL-SCNC: 105 MMOL/L — SIGNIFICANT CHANGE UP (ref 96–108)
CHLORIDE SERPL-SCNC: 105 MMOL/L — SIGNIFICANT CHANGE UP (ref 96–108)
CO2 SERPL-SCNC: 26 MMOL/L — SIGNIFICANT CHANGE UP (ref 22–29)
CO2 SERPL-SCNC: 26 MMOL/L — SIGNIFICANT CHANGE UP (ref 22–29)
CREAT SERPL-MCNC: 0.57 MG/DL — SIGNIFICANT CHANGE UP (ref 0.5–1.3)
CREAT SERPL-MCNC: 0.57 MG/DL — SIGNIFICANT CHANGE UP (ref 0.5–1.3)
EGFR: 126 ML/MIN/1.73M2 — SIGNIFICANT CHANGE UP
EGFR: 126 ML/MIN/1.73M2 — SIGNIFICANT CHANGE UP
EOSINOPHIL # BLD AUTO: 0.34 K/UL — SIGNIFICANT CHANGE UP (ref 0–0.5)
EOSINOPHIL # BLD AUTO: 0.34 K/UL — SIGNIFICANT CHANGE UP (ref 0–0.5)
EOSINOPHIL NFR BLD AUTO: 4.3 % — SIGNIFICANT CHANGE UP (ref 0–6)
EOSINOPHIL NFR BLD AUTO: 4.3 % — SIGNIFICANT CHANGE UP (ref 0–6)
GLUCOSE SERPL-MCNC: 94 MG/DL — SIGNIFICANT CHANGE UP (ref 70–99)
GLUCOSE SERPL-MCNC: 94 MG/DL — SIGNIFICANT CHANGE UP (ref 70–99)
HCT VFR BLD CALC: 45.4 % — SIGNIFICANT CHANGE UP (ref 39–50)
HCT VFR BLD CALC: 45.4 % — SIGNIFICANT CHANGE UP (ref 39–50)
HGB BLD-MCNC: 15.5 G/DL — SIGNIFICANT CHANGE UP (ref 13–17)
HGB BLD-MCNC: 15.5 G/DL — SIGNIFICANT CHANGE UP (ref 13–17)
IMM GRANULOCYTES NFR BLD AUTO: 0.9 % — SIGNIFICANT CHANGE UP (ref 0–0.9)
IMM GRANULOCYTES NFR BLD AUTO: 0.9 % — SIGNIFICANT CHANGE UP (ref 0–0.9)
INR BLD: 1.03 RATIO — SIGNIFICANT CHANGE UP (ref 0.85–1.18)
INR BLD: 1.03 RATIO — SIGNIFICANT CHANGE UP (ref 0.85–1.18)
LYMPHOCYTES # BLD AUTO: 2.45 K/UL — SIGNIFICANT CHANGE UP (ref 1–3.3)
LYMPHOCYTES # BLD AUTO: 2.45 K/UL — SIGNIFICANT CHANGE UP (ref 1–3.3)
LYMPHOCYTES # BLD AUTO: 31.2 % — SIGNIFICANT CHANGE UP (ref 13–44)
LYMPHOCYTES # BLD AUTO: 31.2 % — SIGNIFICANT CHANGE UP (ref 13–44)
MCHC RBC-ENTMCNC: 29.1 PG — SIGNIFICANT CHANGE UP (ref 27–34)
MCHC RBC-ENTMCNC: 29.1 PG — SIGNIFICANT CHANGE UP (ref 27–34)
MCHC RBC-ENTMCNC: 34.1 GM/DL — SIGNIFICANT CHANGE UP (ref 32–36)
MCHC RBC-ENTMCNC: 34.1 GM/DL — SIGNIFICANT CHANGE UP (ref 32–36)
MCV RBC AUTO: 85.3 FL — SIGNIFICANT CHANGE UP (ref 80–100)
MCV RBC AUTO: 85.3 FL — SIGNIFICANT CHANGE UP (ref 80–100)
MONOCYTES # BLD AUTO: 0.47 K/UL — SIGNIFICANT CHANGE UP (ref 0–0.9)
MONOCYTES # BLD AUTO: 0.47 K/UL — SIGNIFICANT CHANGE UP (ref 0–0.9)
MONOCYTES NFR BLD AUTO: 6 % — SIGNIFICANT CHANGE UP (ref 2–14)
MONOCYTES NFR BLD AUTO: 6 % — SIGNIFICANT CHANGE UP (ref 2–14)
NEUTROPHILS # BLD AUTO: 4.48 K/UL — SIGNIFICANT CHANGE UP (ref 1.8–7.4)
NEUTROPHILS # BLD AUTO: 4.48 K/UL — SIGNIFICANT CHANGE UP (ref 1.8–7.4)
NEUTROPHILS NFR BLD AUTO: 57.1 % — SIGNIFICANT CHANGE UP (ref 43–77)
NEUTROPHILS NFR BLD AUTO: 57.1 % — SIGNIFICANT CHANGE UP (ref 43–77)
PLATELET # BLD AUTO: 366 K/UL — SIGNIFICANT CHANGE UP (ref 150–400)
PLATELET # BLD AUTO: 366 K/UL — SIGNIFICANT CHANGE UP (ref 150–400)
POTASSIUM SERPL-MCNC: 5.1 MMOL/L — SIGNIFICANT CHANGE UP (ref 3.5–5.3)
POTASSIUM SERPL-MCNC: 5.1 MMOL/L — SIGNIFICANT CHANGE UP (ref 3.5–5.3)
POTASSIUM SERPL-SCNC: 5.1 MMOL/L — SIGNIFICANT CHANGE UP (ref 3.5–5.3)
POTASSIUM SERPL-SCNC: 5.1 MMOL/L — SIGNIFICANT CHANGE UP (ref 3.5–5.3)
PROT SERPL-MCNC: 7.8 G/DL — SIGNIFICANT CHANGE UP (ref 6.6–8.7)
PROT SERPL-MCNC: 7.8 G/DL — SIGNIFICANT CHANGE UP (ref 6.6–8.7)
PROTHROM AB SERPL-ACNC: 11.4 SEC — SIGNIFICANT CHANGE UP (ref 9.5–13)
PROTHROM AB SERPL-ACNC: 11.4 SEC — SIGNIFICANT CHANGE UP (ref 9.5–13)
RBC # BLD: 5.32 M/UL — SIGNIFICANT CHANGE UP (ref 4.2–5.8)
RBC # BLD: 5.32 M/UL — SIGNIFICANT CHANGE UP (ref 4.2–5.8)
RBC # FLD: 12.2 % — SIGNIFICANT CHANGE UP (ref 10.3–14.5)
RBC # FLD: 12.2 % — SIGNIFICANT CHANGE UP (ref 10.3–14.5)
SODIUM SERPL-SCNC: 141 MMOL/L — SIGNIFICANT CHANGE UP (ref 135–145)
SODIUM SERPL-SCNC: 141 MMOL/L — SIGNIFICANT CHANGE UP (ref 135–145)
TROPONIN T, HIGH SENSITIVITY RESULT: 8 NG/L — SIGNIFICANT CHANGE UP (ref 0–51)
TROPONIN T, HIGH SENSITIVITY RESULT: 8 NG/L — SIGNIFICANT CHANGE UP (ref 0–51)
WBC # BLD: 7.85 K/UL — SIGNIFICANT CHANGE UP (ref 3.8–10.5)
WBC # BLD: 7.85 K/UL — SIGNIFICANT CHANGE UP (ref 3.8–10.5)
WBC # FLD AUTO: 7.85 K/UL — SIGNIFICANT CHANGE UP (ref 3.8–10.5)
WBC # FLD AUTO: 7.85 K/UL — SIGNIFICANT CHANGE UP (ref 3.8–10.5)

## 2023-12-15 PROCEDURE — 84484 ASSAY OF TROPONIN QUANT: CPT

## 2023-12-15 PROCEDURE — 96374 THER/PROPH/DIAG INJ IV PUSH: CPT | Mod: XU

## 2023-12-15 PROCEDURE — 70450 CT HEAD/BRAIN W/O DYE: CPT | Mod: 26,MA,59

## 2023-12-15 PROCEDURE — 85025 COMPLETE CBC W/AUTO DIFF WBC: CPT

## 2023-12-15 PROCEDURE — 99284 EMERGENCY DEPT VISIT MOD MDM: CPT | Mod: 25

## 2023-12-15 PROCEDURE — 0042T: CPT | Mod: MA

## 2023-12-15 PROCEDURE — 70450 CT HEAD/BRAIN W/O DYE: CPT | Mod: MA

## 2023-12-15 PROCEDURE — 85730 THROMBOPLASTIN TIME PARTIAL: CPT

## 2023-12-15 PROCEDURE — T1013: CPT

## 2023-12-15 PROCEDURE — 70498 CT ANGIOGRAPHY NECK: CPT | Mod: MA

## 2023-12-15 PROCEDURE — 85610 PROTHROMBIN TIME: CPT

## 2023-12-15 PROCEDURE — 70496 CT ANGIOGRAPHY HEAD: CPT | Mod: MA

## 2023-12-15 PROCEDURE — 82962 GLUCOSE BLOOD TEST: CPT

## 2023-12-15 PROCEDURE — 36415 COLL VENOUS BLD VENIPUNCTURE: CPT

## 2023-12-15 PROCEDURE — 99285 EMERGENCY DEPT VISIT HI MDM: CPT

## 2023-12-15 PROCEDURE — 70498 CT ANGIOGRAPHY NECK: CPT | Mod: 26,MA

## 2023-12-15 PROCEDURE — 80053 COMPREHEN METABOLIC PANEL: CPT

## 2023-12-15 PROCEDURE — 70496 CT ANGIOGRAPHY HEAD: CPT | Mod: 26,MA

## 2023-12-15 RX ORDER — KETOROLAC TROMETHAMINE 30 MG/ML
15 SYRINGE (ML) INJECTION ONCE
Refills: 0 | Status: COMPLETED | OUTPATIENT
Start: 2023-12-15 | End: 2023-12-15

## 2023-12-15 RX ORDER — METOCLOPRAMIDE HCL 10 MG
10 TABLET ORAL ONCE
Refills: 0 | Status: COMPLETED | OUTPATIENT
Start: 2023-12-15 | End: 2023-12-15

## 2023-12-15 RX ORDER — ACETAMINOPHEN 500 MG
1000 TABLET ORAL ONCE
Refills: 0 | Status: DISCONTINUED | OUTPATIENT
Start: 2023-12-15 | End: 2023-12-22

## 2023-12-15 RX ADMIN — Medication 10 MILLIGRAM(S): at 09:43

## 2023-12-15 NOTE — ED PROVIDER NOTE - OBJECTIVE STATEMENT
43-year-old male with no past medical history presenting with  visual disturbance associated with left facial and left upper extremity tingling and pain.  He states that this is associate with a headache.  Started approximately at 6 AM.  Patient said he woke up normally, however 5 minutes upon awakening  he started to develop changes in his vision and felt tingling.  Denies any fevers or chills, denies any head trauma, denies any history of stroke.  Denies any weakness, dizziness, nausea or vomiting

## 2023-12-15 NOTE — ED PROVIDER NOTE - PHYSICAL EXAMINATION
Gen: NAD, AOx3  Head: NCAT  HEENT: oral mucosa moist, normal conjunctiva, oropharynx clear without exudate or erythema  Lung: CTAB, no respiratory distress, no wheezing, rales, rhonchi  CV: normal s1/s2, rrr, no murmurs, Normal perfusion, pulses 2+ throughout  Abd: soft, NTND  MSK: No edema, no visible deformities, full range of motion in all 4 extremities  Neuro: CN II-XII grossly intact, No focal neurologic deficits, patient reports decreased sensation to light touch over left side of face and left upper extremity, strength 5 out of 5 in all 4 extremities, normal gait, no facial droop noted  Skin: No rash   Psych: normal affect

## 2023-12-15 NOTE — ED ADULT NURSE NOTE - NS ED NURSE LEVEL OF CONSCIOUSNESS ORIENTATION
Airway  Date/Time: 2/15/2021 2:42 PM  Urgency: elective    Airway not difficult    General Information and Staff    Patient location during procedure: OR  Anesthesiologist: Jackie Melo DO  Performed: anesthesiologist     Indications and Patient Condition
Oriented - self; Oriented - place; Oriented - time

## 2023-12-15 NOTE — ED ADULT TRIAGE NOTE - CHIEF COMPLAINT QUOTE
pt c/o pain to left side of body, started 2 hours ago, having pain to left eye, unable to see clearly. its blurry  A&Ox3, resp wnl,, numbness to left side of face

## 2023-12-15 NOTE — ED ADULT NURSE NOTE - NSFALLUNIVINTERV_ED_ALL_ED
Bed/Stretcher in lowest position, wheels locked, appropriate side rails in place/Call bell, personal items and telephone in reach/Instruct patient to call for assistance before getting out of bed/chair/stretcher/Non-slip footwear applied when patient is off stretcher/[place] to call system/Physically safe environment - no spills, clutter or unnecessary equipment/Purposeful proactive rounding/Room/bathroom lighting operational, light cord in reach Bed/Stretcher in lowest position, wheels locked, appropriate side rails in place/Call bell, personal items and telephone in reach/Instruct patient to call for assistance before getting out of bed/chair/stretcher/Non-slip footwear applied when patient is off stretcher/[place] to call system/Physically safe environment - no spills, clutter or unnecessary equipment/Purposeful proactive rounding/Room/bathroom lighting operational, light cord in reach

## 2023-12-15 NOTE — ED PROVIDER NOTE - CLINICAL SUMMARY MEDICAL DECISION MAKING FREE TEXT BOX
43-year-old male presenting with numbness and tingling to left face and left upper extremity as well as visual disturbance in left eye, associate with headache.  Suspect complex migraine, however because patient is within the window for stroke, code stroke was activated, NIH stroke scale 1.  At this time, stroke symptoms are mild and nondisabling, risk of tenecteplase outweighs benefit, will hold off on tenecteplase at this time.  Will treat patient for complex migraine, check labs, reassess. 43-year-old male presenting with numbness and tingling to left face and left upper extremity as well as visual disturbance in left eye, associate with headache.  Suspect complex migraine, however because patient is within the window for stroke, code stroke was activated, NIH stroke scale 1.  At this time, stroke symptoms are mild and nondisabling, risk of tenecteplase outweighs benefit, will hold off on tenecteplase at this time.  Will treat patient for complex migraine, check labs, reassess.    Discussed case with Dr. Chung (stroke neurology)- Agree with no plan for tenecteplase at this time.  Also suspicious for complex migraine.  Patient eloped from ED prior to any results or  imaging discussed with him.

## 2023-12-15 NOTE — ED ADULT NURSE NOTE - OBJECTIVE STATEMENT
Intubation  Performed by: Jhoan Patterson MD  Authorized by: Fawad Paulino MD     Intubation:     Induction:  Intravenous    Intubated:  Postinduction    Mask Ventilation:  Easy mask    Attempts:  2    Attempted By:  Resident anesthesiologist    Method of Intubation:  Direct    Blade:  [place] 2    Laryngeal View Grade: Grade III - only epiglottis visible      Attempted By (2nd Attempt):  Resident anesthesiologist    Method of Intubation (2nd Attempt):  Video laryngoscopy    Blade (2nd Attempt):  Glidescope 4    Laryngeal View Grade (2nd Attempt): Grade I - full view of cords      Difficult Airway Encountered?: No      Complications:  None    Airway Device:  Oral endotracheal tube    Airway Device Size:  7.0    Style/Cuff Inflation:  Cuffed (inflated to minimal occlusive pressure)    Inflation Amount (mL):  6    Tube secured:  21    Secured at:  The lips    Placement Verified By:  Capnometry    Complicating Factors:  Anterior larynx and short neck    Findings Post-Intubation:  BS equal bilateral         Patient presents to ER complaining of headache, left side blurry vision, arm and back pain on left side, patient reports onset of symptoms at 0605, states he woke up at 0600 and was in bathroom when symptoms appeared, patient is ambulatory, no facial droop, equal strength noted in all extremities, resp even/unlabored, denies CP, no dizziness.
